# Patient Record
Sex: FEMALE | Race: WHITE | NOT HISPANIC OR LATINO | Employment: FULL TIME | URBAN - METROPOLITAN AREA
[De-identification: names, ages, dates, MRNs, and addresses within clinical notes are randomized per-mention and may not be internally consistent; named-entity substitution may affect disease eponyms.]

---

## 2018-03-08 DIAGNOSIS — E03.9 HYPOTHYROIDISM, UNSPECIFIED TYPE: Primary | ICD-10-CM

## 2018-03-08 RX ORDER — LEVOTHYROXINE SODIUM 0.15 MG/1
150 TABLET ORAL DAILY
COMMUNITY
End: 2018-03-08 | Stop reason: SDUPTHER

## 2018-03-08 RX ORDER — LEVOTHYROXINE SODIUM 0.15 MG/1
150 TABLET ORAL DAILY
Qty: 30 TABLET | Refills: 0 | Status: SHIPPED | OUTPATIENT
Start: 2018-03-08 | End: 2018-04-13 | Stop reason: CLARIF

## 2018-04-13 ENCOUNTER — OFFICE VISIT (OUTPATIENT)
Dept: ENDOCRINOLOGY | Facility: HOSPITAL | Age: 48
End: 2018-04-13
Payer: COMMERCIAL

## 2018-04-13 ENCOUNTER — TELEPHONE (OUTPATIENT)
Dept: ENDOCRINOLOGY | Facility: HOSPITAL | Age: 48
End: 2018-04-13

## 2018-04-13 VITALS
HEART RATE: 78 BPM | BODY MASS INDEX: 20.31 KG/M2 | DIASTOLIC BLOOD PRESSURE: 80 MMHG | SYSTOLIC BLOOD PRESSURE: 118 MMHG | WEIGHT: 129.4 LBS | HEIGHT: 67 IN

## 2018-04-13 DIAGNOSIS — E89.0 POSTOPERATIVE HYPOTHYROIDISM: Primary | ICD-10-CM

## 2018-04-13 DIAGNOSIS — D34 FOLLICULAR ADENOMA OF THYROID GLAND: ICD-10-CM

## 2018-04-13 PROCEDURE — 99203 OFFICE O/P NEW LOW 30 MIN: CPT | Performed by: INTERNAL MEDICINE

## 2018-04-13 RX ORDER — LEVOTHYROXINE SODIUM 0.15 MG/1
150 TABLET ORAL DAILY
COMMUNITY
End: 2018-04-13 | Stop reason: SDUPTHER

## 2018-04-13 RX ORDER — DESOGESTREL AND ETHINYL ESTRADIOL 0.15-0.03
1 KIT ORAL DAILY
COMMUNITY
End: 2021-10-01

## 2018-04-13 RX ORDER — LEVOTHYROXINE SODIUM 150 UG/1
150 TABLET ORAL DAILY
Qty: 30 TABLET | Refills: 12 | Status: SHIPPED | OUTPATIENT
Start: 2018-04-13 | End: 2018-05-24 | Stop reason: SDUPTHER

## 2018-04-13 NOTE — PROGRESS NOTES
4/13/2018    Assessment/Plan      Diagnoses and all orders for this visit:    Postoperative hypothyroidism  -     SYNTHROID 150 MCG tablet; Take 1 tablet (150 mcg total) by mouth daily  -     TSH, 3rd generation Lab Collect; Future  -     T4, free Lab Collect; Future    Follicular adenoma of thyroid gland  -     SYNTHROID 150 MCG tablet; Take 1 tablet (150 mcg total) by mouth daily  -     TSH, 3rd generation Lab Collect; Future  -     T4, free Lab Collect; Future    Other orders  -     desogestrel-ethinyl estradiol (DESOGEN) 0 15-30 MG-MCG per tablet; Take 1 tablet by mouth daily  -     Discontinue: levothyroxine (SYNTHROID) 150 mcg tablet; Take 150 mcg by mouth daily        Assessment/Plan:  1  Hypothyroidism post subtotal thyroidectomy  Unfortunately, her thyroid blood work is not here  For now, I will continue the same Synthroid brand 150 mcg daily  Once the blood work is here, we will call her with results and make changes as needed  2   Follicular adenoma of the thyroid  She is post thyroidectomy  She is on thyroid hormone and we keep her thyroid levels in the hyperthyroid side of normal with a TSH below 1  I will see her in a year with preceding TSH and free T4       CC: thyroid consult    History of Present Illness     HPI: Jacquie Reeder is a 50y o  year old female with history of hypothyroidism post subtotal thyroidectomy  While pregnant, in 1996, she was found to have a thyroid nodule that was biopsied and questionable  She was recommended to have surgery  She waited until after she delivered and had a subtotal thyroidectomy in 1996  The pathology did show a benign follicular adenoma of the thyroid  She has been on thyroid hormone ever since  She is on brand-name Synthroid 150 mcg 1 tablet daily  She denies heat or cold intolerance, but is having some sweats at night  Menstrual cycles are occurring on a regular monthly basis well on her oral contraceptives, although they are light in flow  She denies palpitation, tremors, anxiety, insomnia, or fatigue  She denies diarrhea or constipation  Weight has been stable  She has lost 3 lb since 2017  She denies diplopia  She has no difficulties with swallowing  Review of Systems   Constitutional: Positive for diaphoresis  Negative for fatigue and unexpected weight change  Some increase in diaphoresis at night  Weight about 3 lbs less than 2017  HENT: Negative for ear pain, hearing loss and tinnitus  Eyes: Negative for visual disturbance  No diplopia  Respiratory: Negative for chest tightness and shortness of breath  Cardiovascular: Negative for chest pain, palpitations and leg swelling  Gastrointestinal: Negative for abdominal pain, constipation, diarrhea and nausea  Some heartburn at times  Endocrine: Negative for cold intolerance and heat intolerance  Genitourinary:        Menses regular on a monthly basis but light on OCP  Musculoskeletal: Positive for back pain  Negative for arthralgias  Has some low back pain  Is primarily on the left  Skin: Negative for rash  No dry skin, brittle nails, or hair loss  Neurological: Negative for dizziness, tremors, light-headedness, numbness and headaches  Psychiatric/Behavioral: Negative for dysphoric mood and sleep disturbance  The patient is not nervous/anxious  Historical Information   No past medical history on file    Past Surgical History:   Procedure Laterality Date    THYROIDECTOMY      subtotal    WISDOM TOOTH EXTRACTION       Social History   History   Alcohol Use    8 4 oz/week    14 Glasses of wine per week     History   Drug Use No     History   Smoking Status    Never Smoker   Smokeless Tobacco    Never Used     Family History:   Family History   Problem Relation Age of Onset    No Known Problems Mother     No Known Problems Father     Diabetes type II Sister     No Known Problems Daughter     No Known Problems Son Meds/Allergies   Current Outpatient Prescriptions   Medication Sig Dispense Refill    desogestrel-ethinyl estradiol (DESOGEN) 0 15-30 MG-MCG per tablet Take 1 tablet by mouth daily      SYNTHROID 150 MCG tablet Take 1 tablet (150 mcg total) by mouth daily 30 tablet 12     No current facility-administered medications for this visit  No Known Allergies    Objective   Vitals: Blood pressure 118/80, pulse 78, height 5' 7" (1 702 m), weight 58 7 kg (129 lb 6 4 oz)  Invasive Devices          No matching active lines, drains, or airways          Physical Exam   Constitutional: She is oriented to person, place, and time  She appears well-developed and well-nourished  HENT:   Head: Normocephalic and atraumatic  Eyes: Conjunctivae and EOM are normal  Pupils are equal, round, and reactive to light  No lid lag, stare, proptosis, or periorbital edema  Neck: Normal range of motion  Neck supple  Healed anterior neck scar  No thyroid tissue palpable  Cardiovascular: Normal rate, regular rhythm, normal heart sounds and intact distal pulses  No murmur heard  Pulmonary/Chest: Effort normal and breath sounds normal  She has no wheezes  Abdominal: Soft  Bowel sounds are normal  There is no tenderness  Musculoskeletal: Normal range of motion  She exhibits no edema or deformity  No tremor of the outstretched hands  No CVAT  No spinous process tenderness  Lymphadenopathy:     She has no cervical adenopathy  Neurological: She is alert and oriented to person, place, and time  She has normal reflexes  Skin: Skin is warm and dry  No rash noted  Vitals reviewed  The history was obtained from the review of the chart and from the patient  Lab Results:    Lab work from late February 2018 is not here as yet  We are trying to get that blood work report  No results found for this or any previous visit (from the past [id number] hour(s))        Future Appointments  Date Time Provider Joseph Castano 4/16/2019 8:00 AM Blair Delgado MD [facility]

## 2018-04-13 NOTE — PATIENT INSTRUCTIONS
Awaiting blood work results  We'll call you with results  Continue the same Synthroid for now  Follow up in 1 year with blood work

## 2018-05-24 DIAGNOSIS — E89.0 POSTOPERATIVE HYPOTHYROIDISM: ICD-10-CM

## 2018-05-24 DIAGNOSIS — D34 FOLLICULAR ADENOMA OF THYROID GLAND: ICD-10-CM

## 2018-05-24 RX ORDER — LEVOTHYROXINE SODIUM 150 UG/1
TABLET ORAL
Qty: 90 TABLET | Refills: 0 | Status: SHIPPED | OUTPATIENT
Start: 2018-05-24 | End: 2018-08-24 | Stop reason: SDUPTHER

## 2018-08-24 DIAGNOSIS — D34 FOLLICULAR ADENOMA OF THYROID GLAND: ICD-10-CM

## 2018-08-24 DIAGNOSIS — E89.0 POSTOPERATIVE HYPOTHYROIDISM: ICD-10-CM

## 2018-08-24 RX ORDER — LEVOTHYROXINE SODIUM 150 UG/1
150 TABLET ORAL DAILY
Qty: 90 TABLET | Refills: 2 | Status: SHIPPED | OUTPATIENT
Start: 2018-08-24 | End: 2019-04-16 | Stop reason: SDUPTHER

## 2019-03-25 LAB
T4 FREE SERPL-MCNC: 1.97 NG/DL (ref 0.82–1.77)
TSH SERPL DL<=0.005 MIU/L-ACNC: 0.15 UIU/ML (ref 0.45–4.5)

## 2019-04-16 ENCOUNTER — OFFICE VISIT (OUTPATIENT)
Dept: ENDOCRINOLOGY | Facility: HOSPITAL | Age: 49
End: 2019-04-16
Payer: COMMERCIAL

## 2019-04-16 VITALS
BODY MASS INDEX: 20.76 KG/M2 | HEART RATE: 81 BPM | SYSTOLIC BLOOD PRESSURE: 110 MMHG | DIASTOLIC BLOOD PRESSURE: 74 MMHG | WEIGHT: 129.2 LBS | HEIGHT: 66 IN

## 2019-04-16 DIAGNOSIS — D34 FOLLICULAR ADENOMA OF THYROID GLAND: ICD-10-CM

## 2019-04-16 DIAGNOSIS — E89.0 POSTOPERATIVE HYPOTHYROIDISM: Primary | ICD-10-CM

## 2019-04-16 PROCEDURE — 99213 OFFICE O/P EST LOW 20 MIN: CPT | Performed by: INTERNAL MEDICINE

## 2019-04-16 RX ORDER — LEVOTHYROXINE SODIUM 150 UG/1
TABLET ORAL
Qty: 30 TABLET | Refills: 11 | Status: SHIPPED | OUTPATIENT
Start: 2019-04-16 | End: 2020-04-03

## 2020-01-29 ENCOUNTER — TELEPHONE (OUTPATIENT)
Dept: ENDOCRINOLOGY | Facility: HOSPITAL | Age: 50
End: 2020-01-29

## 2020-03-20 LAB
T4 FREE SERPL-MCNC: 1.8 NG/DL (ref 0.8–1.8)
TSH SERPL-ACNC: 2.61 MIU/L

## 2020-04-03 DIAGNOSIS — E89.0 POSTOPERATIVE HYPOTHYROIDISM: ICD-10-CM

## 2020-04-03 DIAGNOSIS — D34 FOLLICULAR ADENOMA OF THYROID GLAND: ICD-10-CM

## 2020-04-03 RX ORDER — LEVOTHYROXINE SODIUM 150 UG/1
TABLET ORAL
Qty: 30 TABLET | Refills: 11 | Status: SHIPPED | OUTPATIENT
Start: 2020-04-03 | End: 2020-04-21 | Stop reason: SDUPTHER

## 2020-04-21 ENCOUNTER — TELEMEDICINE (OUTPATIENT)
Dept: ENDOCRINOLOGY | Facility: HOSPITAL | Age: 50
End: 2020-04-21
Payer: COMMERCIAL

## 2020-04-21 DIAGNOSIS — E89.0 POSTOPERATIVE HYPOTHYROIDISM: Primary | ICD-10-CM

## 2020-04-21 DIAGNOSIS — D34 FOLLICULAR ADENOMA OF THYROID GLAND: ICD-10-CM

## 2020-04-21 PROCEDURE — 99215 OFFICE O/P EST HI 40 MIN: CPT | Performed by: INTERNAL MEDICINE

## 2020-04-21 RX ORDER — LEVOTHYROXINE SODIUM 150 UG/1
TABLET ORAL
Qty: 90 TABLET | Refills: 3 | Status: SHIPPED | OUTPATIENT
Start: 2020-04-21 | End: 2021-04-26

## 2021-04-24 DIAGNOSIS — E89.0 POSTOPERATIVE HYPOTHYROIDISM: ICD-10-CM

## 2021-04-24 DIAGNOSIS — D34 FOLLICULAR ADENOMA OF THYROID GLAND: ICD-10-CM

## 2021-04-26 RX ORDER — LEVOTHYROXINE SODIUM 0.15 MG/1
TABLET ORAL
Qty: 30 TABLET | Refills: 11 | Status: SHIPPED | OUTPATIENT
Start: 2021-04-26 | End: 2021-04-27 | Stop reason: DRUGHIGH

## 2021-04-27 ENCOUNTER — OFFICE VISIT (OUTPATIENT)
Dept: ENDOCRINOLOGY | Facility: HOSPITAL | Age: 51
End: 2021-04-27
Payer: COMMERCIAL

## 2021-04-27 VITALS
HEIGHT: 66 IN | SYSTOLIC BLOOD PRESSURE: 120 MMHG | WEIGHT: 133.6 LBS | DIASTOLIC BLOOD PRESSURE: 80 MMHG | BODY MASS INDEX: 21.47 KG/M2 | HEART RATE: 62 BPM

## 2021-04-27 DIAGNOSIS — D34 FOLLICULAR ADENOMA OF THYROID GLAND: ICD-10-CM

## 2021-04-27 DIAGNOSIS — E89.0 POSTOPERATIVE HYPOTHYROIDISM: Primary | ICD-10-CM

## 2021-04-27 PROCEDURE — 99213 OFFICE O/P EST LOW 20 MIN: CPT | Performed by: INTERNAL MEDICINE

## 2021-04-27 RX ORDER — LEVOTHYROXINE SODIUM 137 UG/1
137 TABLET ORAL DAILY
Qty: 30 TABLET | Refills: 11 | Status: SHIPPED | OUTPATIENT
Start: 2021-04-27 | End: 2021-09-09 | Stop reason: SDUPTHER

## 2021-04-27 RX ORDER — MELATONIN
1000 DAILY
COMMUNITY

## 2021-04-27 RX ORDER — MAGNESIUM CARB/ALUMINUM HYDROX 105-160MG
TABLET,CHEWABLE ORAL 2 TIMES DAILY
COMMUNITY

## 2021-04-27 RX ORDER — PHENOL 1.4 %
AEROSOL, SPRAY (ML) MUCOUS MEMBRANE DAILY
COMMUNITY

## 2021-04-27 NOTE — PATIENT INSTRUCTIONS
The thyroid is a bit overactive  Let's decrease the levothyroxine to 137 mcg daily  We'll recheck the blood work in 3-4 months  follow up in 1 year with blood work

## 2021-04-27 NOTE — PROGRESS NOTES
4/28/2021    Assessment/Plan      Diagnoses and all orders for this visit:    Postoperative hypothyroidism  -     TSH, 3rd generation Lab Collect; Future  -     T4, free Lab Collect; Future  -     TSH, 3rd generation Lab Collect; Future  -     T4, free Lab Collect; Future  -     levothyroxine 137 mcg tablet; Take 1 tablet (137 mcg total) by mouth daily    Follicular adenoma of thyroid gland  -     TSH, 3rd generation Lab Collect; Future  -     T4, free Lab Collect; Future  -     TSH, 3rd generation Lab Collect; Future  -     T4, free Lab Collect; Future  -     levothyroxine 137 mcg tablet; Take 1 tablet (137 mcg total) by mouth daily    Other orders  -     Calcium Carb-Cholecalciferol (OSCAL-D) 500 mg-200 units per tablet; Take 1 tablet by mouth daily  -     Glucosamine-Chondroitin 750-600 MG TABS; Take by mouth 2 (two) times a day  -     cholecalciferol (VITAMIN D3) 1,000 units tablet; Take 1,000 Units by mouth daily  -     Multiple Vitamins-Minerals (Multivitamin Adults 50+) TABS; Take by mouth daily        Assessment/Plan:   1  Hypothyroidism post thyroidectomy  Her recent thyroid function tests do show a suppressed TSH consistent with biochemical hyperthyroidism and over replacement with thyroid hormone  I have asked her to decrease her levothyroxine to 137 mcg daily  I will repeat thyroid function tests in 3-4 months and adjust her thyroid hormone accordingly  2  Follicular adenoma of the thyroid  Her thyroid nodule was benign  She is on thyroid hormone for replacement purposes only  She has no compressive thyroid symptoms  I have asked her to repeat a TSH and free T4 in 3-4 months  I have asked her to follow up in 1 year with preceding TSH and free T4       CC:   Hypothyroid follow-up    History of Present Illness     HPI: Quoc Riggs is a 44y o  year old female with history of Hypothyroidism post subtotal thyroidectomy for follow-up visit    She was found to have a thyroid nodule on while pregnant in 1996  When the biopsy was done, and there was a questionable pathology  After she delivered, she underwent subtotal thyroidectomy in the final pathology did demonstrate benign follicular adenoma of the thyroid  She has been on thyroid hormone for replacement purposes ever since  She is currently taking levothyroxine 150 mcg 1 tablet 6 days a week and half tablet on the 7th day  Weight is 4 lb more than 2019  She does have some fatigue  She has occasional warm moments but no actual heat or cold intolerance  She denies palpitation or tremors  She denies dry skin, brittle nails, or hair loss  She has some insomnia will wake up at night and be unable to get back to sleep  She denies anxiety or depression, diarrhea or constipation  She denies diplopia  She denies compressive thyroid symptoms or difficulties with swallowing  She was not getting menstrual periods while on oral contraceptives and stopped her oral contraceptives in August 2020  She has not had a menstrual period until last month when she did have a menses  Review of Systems   Constitutional: Positive for fatigue  Negative for unexpected weight change  Weight 4 lb more than 2019  HENT: Negative for trouble swallowing  Eyes: Negative for visual disturbance  Wears glasses  No diplopia  Respiratory: Negative for chest tightness and shortness of breath  Cardiovascular: Negative for chest pain and palpitations  Gastrointestinal: Negative for abdominal pain, constipation, diarrhea and nausea  Endocrine: Negative for cold intolerance and heat intolerance  Occasional warm moments  Genitourinary:        Stopped OCP in august 2020  No menses until last month and now only one recurred  Musculoskeletal: Positive for back pain  SI joint pain bilateral, low back/buttocks pain  particularly with bending and certain movements  Saw chiropractor  Skin: Negative for rash  No dry skin   No brittle nails  No hair loss  Neurological: Negative for dizziness, tremors, light-headedness and headaches  Psychiatric/Behavioral: Positive for sleep disturbance  Negative for dysphoric mood  The patient is not nervous/anxious  Insomnia, wakes at night and unable to get back to sleep for a while  Historical Information   Past Medical History:   Diagnosis Date    SI (sacroiliac) joint inflammation (HCC)      Past Surgical History:   Procedure Laterality Date    THYROIDECTOMY      subtotal    WISDOM TOOTH EXTRACTION       Social History   Social History     Substance and Sexual Activity   Alcohol Use Yes    Alcohol/week: 14 0 standard drinks    Types: 14 Glasses of wine per week     Social History     Substance and Sexual Activity   Drug Use No     Social History     Tobacco Use   Smoking Status Never Smoker   Smokeless Tobacco Never Used     Family History:   Family History   Problem Relation Age of Onset    No Known Problems Mother     No Known Problems Father     Diabetes type II Sister     No Known Problems Daughter     No Known Problems Son        Meds/Allergies   Current Outpatient Medications   Medication Sig Dispense Refill    Calcium Carb-Cholecalciferol (OSCAL-D) 500 mg-200 units per tablet Take 1 tablet by mouth daily      cholecalciferol (VITAMIN D3) 1,000 units tablet Take 1,000 Units by mouth daily      Glucosamine-Chondroitin 750-600 MG TABS Take by mouth 2 (two) times a day      Multiple Vitamins-Minerals (Multivitamin Adults 50+) TABS Take by mouth daily      desogestrel-ethinyl estradiol (DESOGEN) 0 15-30 MG-MCG per tablet Take 1 tablet by mouth daily      levothyroxine 137 mcg tablet Take 1 tablet (137 mcg total) by mouth daily 30 tablet 11     No current facility-administered medications for this visit  No Known Allergies    Objective   Vitals: Blood pressure 120/80, pulse 62, height 5' 6 22" (1 682 m), weight 60 6 kg (133 lb 9 6 oz)    Invasive Devices None                 Physical Exam  Vitals signs reviewed  Constitutional:       Appearance: Normal appearance  She is well-developed  HENT:      Head: Normocephalic and atraumatic  Eyes:      Extraocular Movements: Extraocular movements intact  Conjunctiva/sclera: Conjunctivae normal       Comments: No lid lag, stare, proptosis, or periorbital edema  Neck:      Musculoskeletal: Normal range of motion and neck supple  Thyroid: No thyromegaly  Vascular: No carotid bruit  Comments: Healed anterior neck scar  No palpable thyroid nodules  Cardiovascular:      Rate and Rhythm: Normal rate and regular rhythm  Heart sounds: Normal heart sounds  No murmur  Pulmonary:      Effort: Pulmonary effort is normal       Breath sounds: Normal breath sounds  No wheezing  Abdominal:      Palpations: Abdomen is soft  Musculoskeletal: Normal range of motion  General: No deformity  Right lower leg: No edema  Left lower leg: No edema  Comments: No tremor of the outstretched hands  Lymphadenopathy:      Cervical: No cervical adenopathy  Skin:     General: Skin is warm and dry  Findings: No rash  Neurological:      Mental Status: She is alert and oriented to person, place, and time  Deep Tendon Reflexes: Reflexes are normal and symmetric  Comments: Deep tendon reflexes normal          The history was obtained from the review of the chart and from the patient  Lab Results:        Blood work done on 04/05/2021 showed a TSH of 0 06 with a free T4 of 1 7        Future Appointments   Date Time Provider Joseph Castano   4/19/2022  8:20 AM Wilmer Mohan MD [facility]

## 2021-08-03 ENCOUNTER — VBI (OUTPATIENT)
Dept: ADMINISTRATIVE | Facility: OTHER | Age: 51
End: 2021-08-03

## 2021-08-03 NOTE — TELEPHONE ENCOUNTER
Upon review of the In Basket request we were able to locate, review, and update the patient chart as requested for Immunization(s) Influenza and Pap Smear (HPV) aka Cervical Cancer Screening  Any additional questions or concerns should be emailed to the Practice Liaisons via [email or web address] email, please do not reply via In Basket      Thank you  Elías Yoo

## 2021-09-07 LAB
T4 FREE SERPL-MCNC: 2 NG/DL (ref 0.8–1.8)
TSH SERPL-ACNC: 0.16 MIU/L

## 2021-09-09 DIAGNOSIS — D34 FOLLICULAR ADENOMA OF THYROID GLAND: ICD-10-CM

## 2021-09-09 DIAGNOSIS — E89.0 POSTOPERATIVE HYPOTHYROIDISM: Primary | ICD-10-CM

## 2021-09-09 DIAGNOSIS — E89.0 POSTOPERATIVE HYPOTHYROIDISM: ICD-10-CM

## 2021-09-09 RX ORDER — LEVOTHYROXINE SODIUM 137 UG/1
TABLET ORAL
Qty: 30 TABLET | Refills: 11
Start: 2021-09-09 | End: 2021-10-08 | Stop reason: SDUPTHER

## 2021-10-01 ENCOUNTER — ANNUAL EXAM (OUTPATIENT)
Dept: OBGYN CLINIC | Facility: CLINIC | Age: 51
End: 2021-10-01
Payer: COMMERCIAL

## 2021-10-01 VITALS
DIASTOLIC BLOOD PRESSURE: 70 MMHG | WEIGHT: 132 LBS | SYSTOLIC BLOOD PRESSURE: 100 MMHG | BODY MASS INDEX: 20.72 KG/M2 | HEIGHT: 67 IN

## 2021-10-01 DIAGNOSIS — Z01.419 ENCOUNTER FOR GYNECOLOGICAL EXAMINATION WITHOUT ABNORMAL FINDING: Primary | ICD-10-CM

## 2021-10-01 DIAGNOSIS — Z12.4 CERVICAL CANCER SCREENING: ICD-10-CM

## 2021-10-01 DIAGNOSIS — Z12.31 ENCOUNTER FOR SCREENING MAMMOGRAM FOR MALIGNANT NEOPLASM OF BREAST: ICD-10-CM

## 2021-10-01 PROCEDURE — 99396 PREV VISIT EST AGE 40-64: CPT | Performed by: OBSTETRICS & GYNECOLOGY

## 2021-10-04 LAB
CLINICAL INFO: NORMAL
CYTOLOGY CMNT CVX/VAG CYTO-IMP: NORMAL
DATE PREVIOUS BX: NORMAL
HPV E6+E7 MRNA CVX QL NAA+PROBE: NOT DETECTED
LMP START DATE: NORMAL
SL AMB PREV. PAP:: NORMAL
SPECIMEN SOURCE CVX/VAG CYTO: NORMAL
STAT OF ADQ CVX/VAG CYTO-IMP: NORMAL

## 2021-10-08 ENCOUNTER — NURSE TRIAGE (OUTPATIENT)
Dept: OTHER | Facility: OTHER | Age: 51
End: 2021-10-08

## 2021-10-08 DIAGNOSIS — D34 FOLLICULAR ADENOMA OF THYROID GLAND: ICD-10-CM

## 2021-10-08 DIAGNOSIS — E89.0 POSTOPERATIVE HYPOTHYROIDISM: ICD-10-CM

## 2021-10-08 RX ORDER — LEVOTHYROXINE SODIUM 137 UG/1
TABLET ORAL
Qty: 7 TABLET | Refills: 0 | Status: SHIPPED | OUTPATIENT
Start: 2021-10-08 | End: 2021-10-11 | Stop reason: SDUPTHER

## 2021-10-11 DIAGNOSIS — D34 FOLLICULAR ADENOMA OF THYROID GLAND: ICD-10-CM

## 2021-10-11 DIAGNOSIS — E89.0 POSTOPERATIVE HYPOTHYROIDISM: Primary | ICD-10-CM

## 2021-10-11 RX ORDER — LEVOTHYROXINE SODIUM 137 UG/1
TABLET ORAL
Qty: 78 TABLET | Refills: 2 | Status: SHIPPED | OUTPATIENT
Start: 2021-10-11 | End: 2022-04-19 | Stop reason: SDUPTHER

## 2021-12-07 LAB
T4 FREE SERPL-MCNC: 2 NG/DL (ref 0.8–1.8)
TSH SERPL-ACNC: 0.37 MIU/L

## 2022-04-19 ENCOUNTER — OFFICE VISIT (OUTPATIENT)
Dept: ENDOCRINOLOGY | Facility: HOSPITAL | Age: 52
End: 2022-04-19
Payer: COMMERCIAL

## 2022-04-19 VITALS
WEIGHT: 134.4 LBS | HEART RATE: 74 BPM | BODY MASS INDEX: 21.09 KG/M2 | SYSTOLIC BLOOD PRESSURE: 100 MMHG | DIASTOLIC BLOOD PRESSURE: 80 MMHG | HEIGHT: 67 IN

## 2022-04-19 DIAGNOSIS — D34 FOLLICULAR ADENOMA OF THYROID GLAND: ICD-10-CM

## 2022-04-19 DIAGNOSIS — E89.0 POSTOPERATIVE HYPOTHYROIDISM: Primary | ICD-10-CM

## 2022-04-19 PROCEDURE — 99213 OFFICE O/P EST LOW 20 MIN: CPT | Performed by: INTERNAL MEDICINE

## 2022-04-19 RX ORDER — LEVOTHYROXINE SODIUM 137 UG/1
TABLET ORAL
Qty: 90 TABLET | Refills: 3 | Status: SHIPPED | OUTPATIENT
Start: 2022-04-19 | End: 2022-06-27

## 2022-04-19 NOTE — PROGRESS NOTES
4/19/2022    Assessment/Plan      Diagnoses and all orders for this visit:    Postoperative hypothyroidism  -     TSH, 3rd generation Lab Collect; Future  -     T4, free Lab Collect; Future  -     levothyroxine 137 mcg tablet; 1 tablet 6 days a week and 1/2 tablet on sunday  -     CBC and differential Lab Collect; Future  -     Comprehensive metabolic panel Lab Collect; Future  -     Lipid Panel with Direct LDL reflex Lab Collect; Future    Follicular adenoma of thyroid gland  -     TSH, 3rd generation Lab Collect; Future  -     T4, free Lab Collect; Future  -     levothyroxine 137 mcg tablet; 1 tablet 6 days a week and 1/2 tablet on sunday  -     CBC and differential Lab Collect; Future  -     Comprehensive metabolic panel Lab Collect; Future  -     Lipid Panel with Direct LDL reflex Lab Collect; Future        Assessment/Plan:  1  Hypothyroidism post subtotal thyroidectomy  Most recent thyroid function tests are normal   She is biochemically and clinically euthyroid  She will continue the same levothyroxine 137 mcg 1 tablet 6 days a week and half tablet on Sunday  2  Follicular adenoma of the thyroid gland  This was a benign process so no suppressive thyroid hormone therapy needs to be performed  I have asked her to follow up in 1 year with preceding TSH, free T4, CMP, CBC, and lipid panel  CC:  Hypothyroid follow-up    History of Present Illness     HPI: Luc Collado is a 46y o  year old female with history of hypothyroidism post subtotal thyroidectomy benign follicular adenoma for follow-up visit  She was found to have a thyroid nodule when pregnant in 1996  The biopsy was indeterminate on pathology  After she delivered, she underwent subtotal thyroidectomy and the final pathology did demonstrated benign follicular adenoma  She is on thyroid hormone for replacement purposes  She is currently taking levothyroxine 137 mcg tablet 6 days a week and half tablet on Sunday    She does have some fatigue but also has some insomnia in that she will wake up at night and be unable to get back to sleep  She denies heat or cold intolerance, diarrhea or constipation, anxiety or depression, palpitation, tremors, weight changes, dry skin, brittle nails, and hair loss  She has no compressive thyroid symptoms or difficulties with swallowing  She denies diplopia  Review of Systems   Constitutional: Positive for fatigue  Negative for unexpected weight change  HENT: Negative for trouble swallowing  Eyes: Negative for visual disturbance  Wears glasses  No diplopia  Respiratory: Negative for chest tightness and shortness of breath  Cardiovascular: Negative for chest pain and palpitations  Gastrointestinal: Negative for abdominal pain, constipation, diarrhea and nausea  Endocrine: Negative for cold intolerance and heat intolerance  Genitourinary:        Postmenopausal    Skin: Negative for rash  No dry skin  No brittle nails  No hair loss  Neurological: Negative for dizziness, tremors, light-headedness and headaches  Psychiatric/Behavioral: Positive for sleep disturbance  Negative for dysphoric mood  The patient is not nervous/anxious  Wakes up in the night and then can't get back to sleep          Historical Information   Past Medical History:   Diagnosis Date    Hyperthyroidism     Papanicolaou smear for cervical cancer screening 2018    SI (sacroiliac) joint inflammation ([facility] [address] )      Past Surgical History:   Procedure Laterality Date    MAMMO (HISTORICAL)  2012    THYROIDECTOMY      subtotal    WISDOM TOOTH EXTRACTION       Social History   Social History     Substance and Sexual Activity   Alcohol Use Yes    Alcohol/week: 14 0 standard drinks    Types: 14 Glasses of wine per week     Social History     Substance and Sexual Activity   Drug Use No     Social History     Tobacco Use   Smoking Status Never Smoker   Smokeless Tobacco Never Used     Family History: Family History   Problem Relation Age of Onset    No Known Problems Mother     Thyroid disease Father     Diabetes type II Sister     No Known Problems Daughter     No Known Problems Son     Hypertension Maternal Grandmother        Meds/Allergies   Current Outpatient Medications   Medication Sig Dispense Refill    Calcium Carb-Cholecalciferol (OSCAL-D) 500 mg-200 units per tablet Take 1 tablet by mouth daily      cholecalciferol (VITAMIN D3) 1,000 units tablet Take 1,000 Units by mouth daily      Glucosamine-Chondroitin 750-600 MG TABS Take by mouth 2 (two) times a day      levothyroxine 137 mcg tablet 1 tablet 6 days a week and 1/2 tablet on sunday 90 tablet 3    Multiple Vitamins-Minerals (Multivitamin Adults 50+) TABS Take by mouth daily       No current facility-administered medications for this visit  No Known Allergies    Objective   Vitals: Blood pressure 100/80, pulse 74, height 5' 7" (1 702 m), weight 61 kg (134 lb 6 4 oz)  Invasive Devices  Report    None                 Physical Exam  Vitals reviewed  Constitutional:       Appearance: Normal appearance  She is well-developed and normal weight  HENT:      Head: Normocephalic and atraumatic  Eyes:      Extraocular Movements: Extraocular movements intact  Conjunctiva/sclera: Conjunctivae normal       Comments: No lid lag, stare, proptosis, or periorbital edema  Neck:      Thyroid: No thyromegaly  Vascular: No carotid bruit  Comments: Healed anterior neck scar  No palpable thyroid tissue  Cardiovascular:      Rate and Rhythm: Normal rate and regular rhythm  Heart sounds: Normal heart sounds  No murmur heard  Pulmonary:      Effort: Pulmonary effort is normal       Breath sounds: Normal breath sounds  No wheezing  Abdominal:      Palpations: Abdomen is soft  Musculoskeletal:         General: No deformity  Normal range of motion  Cervical back: Normal range of motion and neck supple        Right lower leg: No edema  Left lower leg: No edema  Comments: No tremor of the outstretched hands  Lymphadenopathy:      Cervical: No cervical adenopathy  Skin:     General: Skin is warm and dry  Findings: No rash  Neurological:      Mental Status: She is alert and oriented to person, place, and time  Deep Tendon Reflexes: Reflexes are normal and symmetric  Comments: Deep tendon reflexes normal          The history was obtained from the review of the chart and from the patient  Lab Results:   Blood work done on 04/11/2022 showed a TSH of 1 07          Lab Results   Component Value Date    TSH 0 37 (L) 12/07/2021    FREET4 2 0 (H) 12/07/2021       Future Appointments   Date Time Provider Joseph Castano   4/21/2023  8:00 AM Yaneli Shirley MD [facility]

## 2022-04-19 NOTE — PATIENT INSTRUCTIONS
The thyroid blood work is normal    Continue the same levothyroxine dosage  Follow up in 1 year with blood work

## 2022-06-25 DIAGNOSIS — E89.0 POSTOPERATIVE HYPOTHYROIDISM: ICD-10-CM

## 2022-06-25 DIAGNOSIS — D34 FOLLICULAR ADENOMA OF THYROID GLAND: ICD-10-CM

## 2022-06-27 RX ORDER — LEVOTHYROXINE SODIUM 137 UG/1
TABLET ORAL
Qty: 78 TABLET | Refills: 5 | Status: SHIPPED | OUTPATIENT
Start: 2022-06-27

## 2022-08-16 DIAGNOSIS — E89.0 POSTOPERATIVE HYPOTHYROIDISM: ICD-10-CM

## 2022-08-16 DIAGNOSIS — D34 FOLLICULAR ADENOMA OF THYROID GLAND: ICD-10-CM

## 2022-08-16 RX ORDER — LEVOTHYROXINE SODIUM 137 UG/1
TABLET ORAL
Qty: 84 TABLET | Refills: 6 | Status: SHIPPED | OUTPATIENT
Start: 2022-08-16 | End: 2022-10-10

## 2022-10-09 DIAGNOSIS — D34 FOLLICULAR ADENOMA OF THYROID GLAND: ICD-10-CM

## 2022-10-09 DIAGNOSIS — E89.0 POSTOPERATIVE HYPOTHYROIDISM: ICD-10-CM

## 2022-10-10 RX ORDER — LEVOTHYROXINE SODIUM 137 UG/1
TABLET ORAL
Qty: 78 TABLET | Refills: 8 | Status: SHIPPED | OUTPATIENT
Start: 2022-10-10

## 2022-11-05 DIAGNOSIS — D34 FOLLICULAR ADENOMA OF THYROID GLAND: ICD-10-CM

## 2022-11-05 DIAGNOSIS — E89.0 POSTOPERATIVE HYPOTHYROIDISM: ICD-10-CM

## 2022-11-07 RX ORDER — LEVOTHYROXINE SODIUM 137 UG/1
TABLET ORAL
Qty: 78 TABLET | Refills: 9 | Status: SHIPPED | OUTPATIENT
Start: 2022-11-07

## 2022-12-02 DIAGNOSIS — D34 FOLLICULAR ADENOMA OF THYROID GLAND: ICD-10-CM

## 2022-12-02 DIAGNOSIS — E89.0 POSTOPERATIVE HYPOTHYROIDISM: ICD-10-CM

## 2022-12-02 RX ORDER — LEVOTHYROXINE SODIUM 137 UG/1
TABLET ORAL
Qty: 78 TABLET | Refills: 10 | Status: SHIPPED | OUTPATIENT
Start: 2022-12-02

## 2022-12-26 DIAGNOSIS — E89.0 POSTOPERATIVE HYPOTHYROIDISM: ICD-10-CM

## 2022-12-26 DIAGNOSIS — D34 FOLLICULAR ADENOMA OF THYROID GLAND: ICD-10-CM

## 2022-12-27 RX ORDER — LEVOTHYROXINE SODIUM 137 UG/1
TABLET ORAL
Qty: 78 TABLET | Refills: 11 | Status: SHIPPED | OUTPATIENT
Start: 2022-12-27

## 2023-01-21 DIAGNOSIS — D34 FOLLICULAR ADENOMA OF THYROID GLAND: ICD-10-CM

## 2023-01-21 DIAGNOSIS — E89.0 POSTOPERATIVE HYPOTHYROIDISM: ICD-10-CM

## 2023-01-23 RX ORDER — LEVOTHYROXINE SODIUM 137 UG/1
TABLET ORAL
Qty: 78 TABLET | Refills: 0 | Status: SHIPPED | OUTPATIENT
Start: 2023-01-23

## 2023-02-16 DIAGNOSIS — E89.0 POSTOPERATIVE HYPOTHYROIDISM: ICD-10-CM

## 2023-02-16 DIAGNOSIS — D34 FOLLICULAR ADENOMA OF THYROID GLAND: ICD-10-CM

## 2023-02-16 RX ORDER — LEVOTHYROXINE SODIUM 137 UG/1
TABLET ORAL
Qty: 78 TABLET | Refills: 1 | Status: SHIPPED | OUTPATIENT
Start: 2023-02-16

## 2023-03-18 LAB
ALBUMIN SERPL-MCNC: 4.1 G/DL (ref 3.8–4.9)
ALBUMIN/GLOB SERPL: 1.7 {RATIO} (ref 1.2–2.2)
ALP SERPL-CCNC: 73 IU/L (ref 44–121)
ALT SERPL-CCNC: 30 IU/L (ref 0–32)
AST SERPL-CCNC: 31 IU/L (ref 0–40)
BASOPHILS # BLD AUTO: 0.1 X10E3/UL (ref 0–0.2)
BASOPHILS NFR BLD AUTO: 1 %
BILIRUB SERPL-MCNC: 0.4 MG/DL (ref 0–1.2)
BUN SERPL-MCNC: 13 MG/DL (ref 6–24)
BUN/CREAT SERPL: 14 (ref 9–23)
CALCIUM SERPL-MCNC: 9.7 MG/DL (ref 8.7–10.2)
CHLORIDE SERPL-SCNC: 100 MMOL/L (ref 96–106)
CHOLEST SERPL-MCNC: 212 MG/DL (ref 100–199)
CHOLEST/HDLC SERPL: 3.1 RATIO (ref 0–4.4)
CO2 SERPL-SCNC: 29 MMOL/L (ref 20–29)
CREAT SERPL-MCNC: 0.94 MG/DL (ref 0.57–1)
EGFR: 72 ML/MIN/1.73
EOSINOPHIL # BLD AUTO: 0.2 X10E3/UL (ref 0–0.4)
EOSINOPHIL NFR BLD AUTO: 4 %
ERYTHROCYTE [DISTWIDTH] IN BLOOD BY AUTOMATED COUNT: 12.7 % (ref 11.7–15.4)
GLOBULIN SER-MCNC: 2.4 G/DL (ref 1.5–4.5)
GLUCOSE SERPL-MCNC: 93 MG/DL (ref 70–99)
HCT VFR BLD AUTO: 43.5 % (ref 34–46.6)
HDLC SERPL-MCNC: 68 MG/DL
HGB BLD-MCNC: 14.6 G/DL (ref 11.1–15.9)
IMM GRANULOCYTES # BLD: 0 X10E3/UL (ref 0–0.1)
IMM GRANULOCYTES NFR BLD: 0 %
LDLC SERPL CALC-MCNC: 127 MG/DL (ref 0–99)
LYMPHOCYTES # BLD AUTO: 2.2 X10E3/UL (ref 0.7–3.1)
LYMPHOCYTES NFR BLD AUTO: 35 %
MCH RBC QN AUTO: 29.4 PG (ref 26.6–33)
MCHC RBC AUTO-ENTMCNC: 33.6 G/DL (ref 31.5–35.7)
MCV RBC AUTO: 88 FL (ref 79–97)
MONOCYTES # BLD AUTO: 0.5 X10E3/UL (ref 0.1–0.9)
MONOCYTES NFR BLD AUTO: 7 %
NEUTROPHILS # BLD AUTO: 3.3 X10E3/UL (ref 1.4–7)
NEUTROPHILS NFR BLD AUTO: 53 %
PLATELET # BLD AUTO: 193 X10E3/UL (ref 150–450)
POTASSIUM SERPL-SCNC: 4.3 MMOL/L (ref 3.5–5.2)
PROT SERPL-MCNC: 6.5 G/DL (ref 6–8.5)
RBC # BLD AUTO: 4.96 X10E6/UL (ref 3.77–5.28)
SL AMB VLDL CHOLESTEROL CALC: 17 MG/DL (ref 5–40)
SODIUM SERPL-SCNC: 139 MMOL/L (ref 134–144)
T4 FREE SERPL-MCNC: 2.2 NG/DL (ref 0.82–1.77)
TRIGL SERPL-MCNC: 98 MG/DL (ref 0–149)
TSH SERPL DL<=0.005 MIU/L-ACNC: 0.04 UIU/ML (ref 0.45–4.5)
WBC # BLD AUTO: 6.3 X10E3/UL (ref 3.4–10.8)

## 2023-03-21 DIAGNOSIS — D34 FOLLICULAR ADENOMA OF THYROID GLAND: ICD-10-CM

## 2023-03-21 DIAGNOSIS — E89.0 POSTOPERATIVE HYPOTHYROIDISM: ICD-10-CM

## 2023-03-21 RX ORDER — LEVOTHYROXINE SODIUM 137 UG/1
TABLET ORAL
Qty: 78 TABLET | Refills: 2 | Status: SHIPPED | OUTPATIENT
Start: 2023-03-21

## 2023-04-24 NOTE — PROGRESS NOTES
[address], [address], [facility], [address] [facility]    ASSESSMENT/PLAN: Oliver Barber is a 45 y o   who presents for annual gynecologic exam     Encounter for routine gynecologic examination  - Routine well woman exam completed today  - Cervical Cancer Screening: Current ASCCP Guidelines reviewed  Last Pap: 10/01/2021   Next Pap Due:   - COVID vaccine Pfiser  2021  - Breast Cancer Screening: Last Mammogram Not on file,   - Colorectal cancer screening dw  Pt encouraged   The  cologuard  wnl   - The following were reviewed in today's visit: breast self exam, mammography screening ordered, use and side effects of HRT, menopause, osteoporosis, adequate intake of calcium and vitamin D, exercise, healthy diet, DEXA ordered and colonoscopy discussed and declined at this time    Additional problems addressed during this visit:  1  Encounter for gynecological examination without abnormal finding    2  Encounter for screening mammogram for malignant neoplasm of breast  -     Mammo screening bilateral w 3d & cad; Future    3  Contraceptive education    4  BMI 20 0-20 9, adult    Pt  w  lmp in   2021  No bleeding since  Stopped ocp  In 2020  No hot flashes  Sleep is poor  +  No dryness not sexually active  Thyroid is off  Dw pt  Lower normal  BMI   +  Highly encouraged colonoscopy and mammogram     Referred to  Jessica Farley for counseling  Highly encouraged! No feelings of hurting self or others  CC:  Annual Gynecologic Examination    HPI: Oliver Barber is a 45 y o   who presents for annual gynecologic examination  44 yo   here for wellness exam   Neg  cotesting in     Not  Currently sexually active  Denies  Bleeding, blaoting and satiety     Doing okay       The following portions of the patient's history were reviewed and updated as appropriate: She  has a past medical history of Hyperthyroidism, Papanicolaou smear for cervical cancer screening (), "and SI (sacroiliac) joint inflammation ([facility] [address] )  She  has a past surgical history that includes Thyroidectomy; [place] tooth extraction; Mammo (historical) (2023); and Mammo (historical) (2023)  Her family history includes Diabetes type II in her sister; Hypertension in her maternal grandmother; No Known Problems in her daughter, mother, and son; Thyroid disease in her father  She  reports that she has never smoked  She has never used smokeless tobacco  She reports current alcohol use of about 14 0 standard drinks per week  She reports that she does not use drugs  Current Outpatient Medications   Medication Sig Dispense Refill   • Ascorbic Acid (vitamin C) 100 MG tablet Take 100 mg by mouth daily     • Calcium Carb-Cholecalciferol (OSCAL-D) 500 mg-200 units per tablet Take 1 tablet by mouth daily     • cholecalciferol (VITAMIN D3) 1,000 units tablet Take 1,000 Units by mouth daily     • Glucosamine-Chondroitin 750-600 MG TABS Take by mouth 2 (two) times a day     • levothyroxine 137 mcg tablet TAKE 1 TABLET 6 DAYS A WEEK AND 1/2 TABLET ON SUNDAY (Patient taking differently: TAKE 1 TABLET 6 DAYS A WEEK AND NONE ON SUNDAY) 78 tablet 3   • Multiple Vitamins-Minerals (Multivitamin Adults 50+) TABS Take by mouth daily     • zinc gluconate 50 mg tablet Take 50 mg by mouth daily     • Melatonin-Magnesium Citrate (SLOWMAG MG CALM/SLEEP PO) Take by mouth Powder, 1 1/2 teaspoons nightly (Patient not taking: Reported on 4/25/2023)       No current facility-administered medications for this visit  She has No Known Allergies       Review of Systems:  All systems normal except as noted in HPI          Objective:  /64 (BP Location: Left arm, Patient Position: Sitting, Cuff Size: Standard)   Ht 5' 7\" (1 702 m)   Wt 60 4 kg (133 lb 3 2 oz)   BMI 20 86 kg/m²    Physical Exam  Vitals and nursing note reviewed  Constitutional:       Appearance: Normal appearance  HENT:      Head: Normocephalic     Cardiovascular:      " Rate and Rhythm: Normal rate and regular rhythm  Pulses: Normal pulses  Heart sounds: Normal heart sounds  Pulmonary:      Effort: Pulmonary effort is normal       Breath sounds: Normal breath sounds  Chest:      Chest wall: No mass, lacerations, swelling, tenderness or edema  Breasts: Onel Score is 4  Breasts are symmetrical       Right: Normal  No swelling, bleeding, inverted nipple, mass, nipple discharge, skin change or tenderness  Left: No swelling, bleeding, inverted nipple, mass, nipple discharge, skin change or tenderness  Abdominal:      General: Abdomen is flat  Bowel sounds are normal       Palpations: Abdomen is soft  Genitourinary:     General: Normal vulva  Exam position: Lithotomy position  Pubic Area: No rash  Onel stage (genital): 4       Labia:         Right: No rash, tenderness or lesion  Left: No rash, tenderness or lesion  Urethra: No urethral pain, urethral swelling or urethral lesion  Vagina: Normal       Cervix: No cervical motion tenderness or discharge  Uterus: Normal        Adnexa: Right adnexa normal and left adnexa normal       Rectum: Normal    Musculoskeletal:         General: Normal range of motion  Cervical back: Neck supple  Lymphadenopathy:      Upper Body:      Right upper body: No supraclavicular, axillary or pectoral adenopathy  Left upper body: No supraclavicular, axillary or pectoral adenopathy  Lower Body: No right inguinal adenopathy  No left inguinal adenopathy  Skin:     General: Skin is warm and dry  Neurological:      General: No focal deficit present  Mental Status: She is alert and oriented to person, place, and time  Psychiatric:         Mood and Affect: Mood normal          Speech: Speech normal          Behavior: Behavior normal  Behavior is cooperative  Thought Content: Thought content normal  Thought content is not paranoid   Thought content does not include homicidal or suicidal ideation           Cognition and Memory: Cognition normal          Judgment: Judgment normal

## 2023-04-25 ENCOUNTER — ANNUAL EXAM (OUTPATIENT)
Dept: OBGYN CLINIC | Facility: CLINIC | Age: 53
End: 2023-04-25

## 2023-04-25 VITALS
HEIGHT: 67 IN | BODY MASS INDEX: 20.91 KG/M2 | SYSTOLIC BLOOD PRESSURE: 100 MMHG | WEIGHT: 133.2 LBS | DIASTOLIC BLOOD PRESSURE: 64 MMHG

## 2023-04-25 DIAGNOSIS — Z78.0 POSTMENOPAUSAL: ICD-10-CM

## 2023-04-25 DIAGNOSIS — Z12.31 ENCOUNTER FOR SCREENING MAMMOGRAM FOR MALIGNANT NEOPLASM OF BREAST: ICD-10-CM

## 2023-04-25 DIAGNOSIS — Z30.09 CONTRACEPTIVE EDUCATION: ICD-10-CM

## 2023-04-25 DIAGNOSIS — Z01.419 ENCOUNTER FOR GYNECOLOGICAL EXAMINATION WITHOUT ABNORMAL FINDING: Primary | ICD-10-CM

## 2023-05-01 DIAGNOSIS — D34 FOLLICULAR ADENOMA OF THYROID GLAND: ICD-10-CM

## 2023-05-01 DIAGNOSIS — E89.0 POSTOPERATIVE HYPOTHYROIDISM: ICD-10-CM

## 2023-05-02 RX ORDER — LEVOTHYROXINE SODIUM 137 UG/1
TABLET ORAL
Qty: 78 TABLET | Refills: 4 | Status: SHIPPED | OUTPATIENT
Start: 2023-05-02

## 2024-04-20 DIAGNOSIS — D34 FOLLICULAR ADENOMA OF THYROID GLAND: ICD-10-CM

## 2024-04-20 DIAGNOSIS — E89.0 POSTOPERATIVE HYPOTHYROIDISM: ICD-10-CM

## 2024-04-22 RX ORDER — LEVOTHYROXINE SODIUM 137 UG/1
TABLET ORAL
Qty: 26 TABLET | Refills: 1 | Status: SHIPPED | OUTPATIENT
Start: 2024-04-22

## 2024-04-30 LAB
T4 FREE SERPL-MCNC: 1.81 NG/DL (ref 0.82–1.77)
TSH SERPL DL<=0.005 MIU/L-ACNC: 0.29 UIU/ML (ref 0.45–4.5)

## 2024-05-14 ENCOUNTER — OFFICE VISIT (OUTPATIENT)
Dept: ENDOCRINOLOGY | Facility: HOSPITAL | Age: 54
End: 2024-05-14
Payer: COMMERCIAL

## 2024-05-14 VITALS
HEART RATE: 79 BPM | OXYGEN SATURATION: 98 % | WEIGHT: 137.8 LBS | BODY MASS INDEX: 21.63 KG/M2 | DIASTOLIC BLOOD PRESSURE: 78 MMHG | SYSTOLIC BLOOD PRESSURE: 122 MMHG | HEIGHT: 67 IN

## 2024-05-14 DIAGNOSIS — E89.0 POSTOPERATIVE HYPOTHYROIDISM: Primary | ICD-10-CM

## 2024-05-14 DIAGNOSIS — D34 FOLLICULAR ADENOMA OF THYROID GLAND: ICD-10-CM

## 2024-05-14 PROCEDURE — 99214 OFFICE O/P EST MOD 30 MIN: CPT | Performed by: INTERNAL MEDICINE

## 2024-05-14 RX ORDER — LEVOTHYROXINE SODIUM 0.12 MG/1
TABLET ORAL
Qty: 90 TABLET | Refills: 3 | Status: SHIPPED | OUTPATIENT
Start: 2024-05-14

## 2024-05-14 NOTE — PROGRESS NOTES
5/14/2024    Assessment/Plan      Diagnoses and all orders for this visit:    Postoperative hypothyroidism  -     T4, free; Future  -     TSH, 3rd generation; Future  -     T4, free  -     TSH, 3rd generation  -     T4, free; Future  -     TSH, 3rd generation; Future  -     T4, free  -     TSH, 3rd generation  -     levothyroxine 125 mcg tablet; Take 1 tablet 6 days a week    Follicular adenoma of thyroid gland  -     T4, free; Future  -     TSH, 3rd generation; Future  -     T4, free  -     TSH, 3rd generation  -     T4, free; Future  -     TSH, 3rd generation; Future  -     T4, free  -     TSH, 3rd generation        Assessment & Plan  1.  Hypothyroidism post thyroidectomy.  The most recent thyroid function studies indicate a low TSH and an elevated free T4 level, indicative of biochemical hyperthyroidism. Given the current state, I have decided to reduce her levothyroxine dosage to 125 mcg, to be taken one tablet six days a week. I have advised her to repeat her blood work in approximately 3 to 4 months.    2. Follicular adenoma of the thyroid.  This is a non-malignant lesion. A replacement thyroid dosage is recommended. Given that she is currently hyperthyroid, I will reduce her levothyroxine dosage.     I have advised her to undergo a TSH and free T4 test in 3 to 4 months.     A follow-up appointment has been scheduled for 1 year from now, which will include a preceding TSH and free T4.      CC: Hypothyroid follow-up    History of Present Illness    HPI: Korina Taylor is a 53-year-old female with history of hypothyroidism, post subtotal thyroidectomy for a benign follicular adenoma, here for follow-up visit.    The patient is currently on a regimen of 137 mcg of levothyroxine, administered as one tablet six days a week, with a single day off, typically on Fridays or Sundays. She reports feeling well on this regimen, with no noticeable changes. She denies experiencing temperature fluctuations, palpitations,  tremors, or hand tremors. Gastrointestinal symptoms such as diarrhea or constipation are also denied. She does, however, report mild dry skin. There is no reported nail breakage or hair loss. Her sleep pattern is generally satisfactory, with occasional nocturnal awakenings for 1 to 2 hours. However, she acknowledges a previous hip fracture that prevented her from tracking her sleep patterns. Over the past 1 to 2 weeks, her sleep has been satisfactory. Daytime fatigue is minimal. She is postmenopausal and denies any dysphagia, diplopia, anxiety, or depression.    The patient suspects she may be experiencing carpal tunnel syndrome, as evidenced by intermittent nocturnal numbness in her fingers in both hands. Despite the use of a wrist brace, she continues to experience numbness in her fingers. She is scheduled for an EMG at the end of the month. Her occupation as an  involves frequent repetitive hand movements.      Historical Information   Past Medical History:   Diagnosis Date    Hyperthyroidism     Papanicolaou smear for cervical cancer screening 2021    SI (sacroiliac) joint inflammation (HCC)      Past Surgical History:   Procedure Laterality Date    MAMMO (HISTORICAL)  2023    pt states at  [facility] and dense breasts    MAMMO (HISTORICAL)  2023    THYROIDECTOMY      subtotal    [place]IS[place] TOOTH EXTRACTION       Social History   Social History     Substance and Sexual Activity   Alcohol Use Yes    Alcohol/week: 14.0 standard drinks of alcohol    Types: 14 Glasses of wine per week     Social History     Substance and Sexual Activity   Drug Use No     Social History     Tobacco Use   Smoking Status Never   Smokeless Tobacco Never     Family History:   Family History   Problem Relation Age of Onset    No Known Problems Mother     Thyroid disease Father     Diabetes type II Sister     No Known Problems Daughter     No Known Problems Son     Hypertension Maternal Grandmother        Meds/Allergies  "  Current Outpatient Medications   Medication Sig Dispense Refill    Ascorbic Acid (vitamin C) 100 MG tablet Take 100 mg by mouth daily      Calcium Carb-Cholecalciferol (OSCAL-D) 500 mg-200 units per tablet Take 1 tablet by mouth daily      cholecalciferol (VITAMIN D3) 1,000 units tablet Take 1,000 Units by mouth daily      levothyroxine 125 mcg tablet Take 1 tablet 6 days a week 90 tablet 3    Multiple Vitamins-Minerals (Multivitamin Adults 50+) TABS Take by mouth daily      zinc gluconate 50 mg tablet Take 50 mg by mouth daily      Glucosamine-Chondroitin 750-600 MG TABS Take by mouth 2 (two) times a day (Patient not taking: Reported on 5/14/2024)      Melatonin-Magnesium Citrate (SLOWMAG MG CALM/SLEEP PO) Take by mouth Powder, 1 1/2 teaspoons nightly (Patient not taking: Reported on 4/25/2023)       No current facility-administered medications for this visit.     No Known Allergies    Objective   Vitals: Blood pressure 122/78, pulse 79, height 5' 7\" (1.702 m), weight 62.5 kg (137 lb 12.8 oz), SpO2 98%.  Invasive Devices       None                   Physical Exam  Physical exam normal with pertinent positives and negatives.    No lid lag, stare, proptosis, or periorbital edema in the eyes.  There is a healed anterior scar on the neck. No palpable thyroid tissue in the neck. No lymphadenopathy in the neck.  Lungs are clear to auscultation.  Heart has a regular rate and rhythm. No murmurs detected.  No tremor noted in the outstretched hands.  Patellar deep tendon reflexes are normal with right more brisk than the left.      The history was obtained from the review of the chart and from the patient.    Lab Results:          Lab Results   Component Value Date    CREATININE 0.94 03/17/2023    BUN 13 03/17/2023    K 4.3 03/17/2023     03/17/2023    CO2 29 03/17/2023     eGFR   Date Value Ref Range Status   03/17/2023 72 >59 mL/min/1.73 Final         Lab Results   Component Value Date    HDL 68 03/17/2023    TRIG " 98 03/17/2023    CHOLHDL 3.1 03/17/2023       Lab Results   Component Value Date    ALT 30 03/17/2023    AST 31 03/17/2023       Lab Results   Component Value Date    TSH 0.286 (L) 04/29/2024    FREET4 1.81 (H) 04/29/2024             Future Appointments   Date Time Provider Department Center   5/28/2024  8:00 AM [facility]   5/14/2025  8:00 AM Rocio Mcdonald MD [facility]

## 2024-05-14 NOTE — PATIENT INSTRUCTIONS
The thyroid is a bit overactive at present.     Decrease the levothyroxine 125 mcg 6 days a week.     We'll recheck blood work in 3-4 months.     Follow up in 1 year with blood work.

## 2024-05-27 NOTE — PROGRESS NOTES
[facility] - [address], [address], [place] [address]    ASSESSMENT/PLAN: Kornia Taylor is a 53 y.o.  who presents for annual gynecologic exam.    Encounter for routine gynecologic examination  - Routine well woman exam completed today.  - Cervical Cancer Screening: Current ASCCP Guidelines reviewed. Last Pap: 10/01/2021 . Next Pap Due: today  - STI screening offered including HIV testing: Declined  - Contraceptive counseling discussed.  Current contraception: none:   - Breast Cancer Screening: Last Mammogram Not on file, pt states that she is due now   -colonoscopy  pt  did  cologuard last year     Additional problems addressed during this visit:  1. Encounter for gynecological examination without abnormal finding  2. Encounter for screening mammogram for malignant neoplasm of breast  -     Mammo screening bilateral w 3d & cad; Future  3. Screening for cervical cancer  -     IGP, Aptima HPV, Rfx 16/18,45  4. Postmenopausal      CC:  Annual Gynecologic Examination    HPI: Korina Taylor is a 53 y.o.  who presents for annual gynecologic examination.  54 yo  here for wellness exam.  LMP 2021  No bleeding, bloating or  satiety.        The following portions of the patient's history were reviewed and updated as appropriate: She  has a past medical history of Hyperthyroidism, Papanicolaou smear for cervical cancer screening (), and SI (sacroiliac) joint inflammation (HCC).  She  has a past surgical history that includes Thyroidectomy; [place] tooth extraction; Mammo (historical) (); and Mammo (historical) ().  Her family history includes Diabetes type II in her sister; Hypertension in her maternal grandmother; No Known Problems in her daughter, mother, and son; Thyroid disease in her father.  She  reports that she has never smoked. She has never used smokeless tobacco. She reports current alcohol use of about 14.0 standard drinks of alcohol per week. She reports that she does  "not use drugs.  Current Outpatient Medications   Medication Sig Dispense Refill   • Ascorbic Acid (vitamin C) 100 MG tablet Take 100 mg by mouth daily     • cholecalciferol (VITAMIN D3) 1,000 units tablet Take 1,000 Units by mouth daily     • levothyroxine 125 mcg tablet Take 1 tablet 6 days a week 90 tablet 3   • Multiple Vitamins-Minerals (Multivitamin Adults 50+) TABS Take by mouth daily     • zinc gluconate 50 mg tablet Take 50 mg by mouth daily       No current facility-administered medications for this visit.     She has No Known Allergies..    Review of Systems   Constitutional:  Negative for chills and fever.   HENT:  Negative for ear pain and sore throat.    Eyes:  Negative for pain and visual disturbance.   Respiratory:  Negative for cough and shortness of breath.    Cardiovascular:  Negative for chest pain and palpitations.   Gastrointestinal:  Negative for abdominal pain and vomiting.   Endocrine: Negative.    Genitourinary: Negative.  Negative for dysuria and hematuria.   Musculoskeletal: Negative.  Negative for arthralgias and back pain.   Skin:  Negative for color change and rash.   Allergic/Immunologic: Negative.    Neurological: Negative.  Negative for seizures and syncope.   Hematological: Negative.    Psychiatric/Behavioral: Negative.     All other systems reviewed and are negative.        Objective:  /74 (BP Location: Left arm, Patient Position: Sitting, Cuff Size: Standard)   Ht 5' 5.5\" (1.664 m)   Wt 61.8 kg (136 lb 3.2 oz)   LMP  (LMP Unknown)   Breastfeeding No   BMI 22.32 kg/m²    Physical Exam  Vitals and nursing note reviewed.   Constitutional:       Appearance: Normal appearance.   HENT:      Head: Normocephalic.   Cardiovascular:      Rate and Rhythm: Normal rate and regular rhythm.      Pulses: Normal pulses.      Heart sounds: Normal heart sounds.   Pulmonary:      Effort: Pulmonary effort is normal.      Breath sounds: Normal breath sounds.   Chest:      Chest wall: No " mass, lacerations, swelling, tenderness or edema.   Breasts:     Onel Score is 4.      Breasts are symmetrical.      Right: Normal. No swelling, bleeding, inverted nipple, mass, nipple discharge, skin change or tenderness.      Left: No swelling, bleeding, inverted nipple, mass, nipple discharge, skin change or tenderness.   Abdominal:      General: Abdomen is flat. Bowel sounds are normal.      Palpations: Abdomen is soft.   Genitourinary:     General: Normal vulva.      Exam position: Lithotomy position.      Pubic Area: No rash.       Onel stage (genital): 4.      Labia:         Right: No rash, tenderness or lesion.         Left: No rash, tenderness or lesion.       Urethra: No urethral pain, urethral swelling or urethral lesion.      Cervix: Friability present. No cervical motion tenderness or discharge.      Uterus: Normal.       Adnexa: Right adnexa normal and left adnexa normal.      Rectum: Normal.      Comments: Atrophy noted    Musculoskeletal:         General: Normal range of motion.      Cervical back: Neck supple.   Lymphadenopathy:      Upper Body:      Right upper body: No supraclavicular, axillary or pectoral adenopathy.      Left upper body: No supraclavicular, axillary or pectoral adenopathy.      Lower Body: No right inguinal adenopathy. No left inguinal adenopathy.   Skin:     General: Skin is warm and dry.   Neurological:      General: No focal deficit present.      Mental Status: She is alert and oriented to person, place, and time.   Psychiatric:         Mood and Affect: Mood normal.         Behavior: Behavior normal.         Thought Content: Thought content normal.         Judgment: Judgment normal.

## 2024-05-28 ENCOUNTER — ANNUAL EXAM (OUTPATIENT)
Dept: OBGYN CLINIC | Facility: CLINIC | Age: 54
End: 2024-05-28
Payer: COMMERCIAL

## 2024-05-28 VITALS
WEIGHT: 136.2 LBS | BODY MASS INDEX: 21.89 KG/M2 | SYSTOLIC BLOOD PRESSURE: 112 MMHG | HEIGHT: 66 IN | DIASTOLIC BLOOD PRESSURE: 74 MMHG

## 2024-05-28 DIAGNOSIS — Z01.419 ENCOUNTER FOR GYNECOLOGICAL EXAMINATION WITHOUT ABNORMAL FINDING: Primary | ICD-10-CM

## 2024-05-28 DIAGNOSIS — Z12.31 ENCOUNTER FOR SCREENING MAMMOGRAM FOR MALIGNANT NEOPLASM OF BREAST: ICD-10-CM

## 2024-05-28 DIAGNOSIS — Z12.4 SCREENING FOR CERVICAL CANCER: ICD-10-CM

## 2024-05-28 DIAGNOSIS — Z78.0 POSTMENOPAUSAL: ICD-10-CM

## 2024-05-28 PROCEDURE — S0612 ANNUAL GYNECOLOGICAL EXAMINA: HCPCS | Performed by: OBSTETRICS & GYNECOLOGY

## 2024-05-30 LAB
CYTOLOGIST CVX/VAG CYTO: NORMAL
DX ICD CODE: NORMAL
HPV GENOTYPE REFLEX: NORMAL
HPV I/H RISK 4 DNA CVX QL PROBE+SIG AMP: NEGATIVE
OTHER STN SPEC: NORMAL
PATH REPORT.FINAL DX SPEC: NORMAL
SL AMB NOTE:: NORMAL
SL AMB SPECIMEN ADEQUACY: NORMAL
SL AMB TEST METHODOLOGY: NORMAL

## 2024-06-06 DIAGNOSIS — E89.0 POSTOPERATIVE HYPOTHYROIDISM: ICD-10-CM

## 2024-06-06 RX ORDER — LEVOTHYROXINE SODIUM 0.12 MG/1
TABLET ORAL
Qty: 72 TABLET | Refills: 1 | Status: SHIPPED | OUTPATIENT
Start: 2024-06-06

## 2024-06-27 DIAGNOSIS — E89.0 POSTOPERATIVE HYPOTHYROIDISM: ICD-10-CM

## 2024-06-27 PROBLEM — Z01.419 ENCOUNTER FOR GYNECOLOGICAL EXAMINATION WITHOUT ABNORMAL FINDING: Status: RESOLVED | Noted: 2024-05-28 | Resolved: 2024-06-27

## 2024-06-27 PROBLEM — Z12.4 SCREENING FOR CERVICAL CANCER: Status: RESOLVED | Noted: 2024-05-28 | Resolved: 2024-06-27

## 2024-06-27 RX ORDER — LEVOTHYROXINE SODIUM 0.12 MG/1
TABLET ORAL
Qty: 72 TABLET | Refills: 1 | Status: SHIPPED | OUTPATIENT
Start: 2024-06-27

## 2024-07-17 DIAGNOSIS — E89.0 POSTOPERATIVE HYPOTHYROIDISM: ICD-10-CM

## 2024-07-17 RX ORDER — LEVOTHYROXINE SODIUM 0.12 MG/1
TABLET ORAL
Qty: 72 TABLET | Refills: 1 | Status: SHIPPED | OUTPATIENT
Start: 2024-07-17

## 2024-08-14 DIAGNOSIS — E89.0 POSTOPERATIVE HYPOTHYROIDISM: ICD-10-CM

## 2024-08-14 RX ORDER — LEVOTHYROXINE SODIUM 125 UG/1
TABLET ORAL
Qty: 72 TABLET | Refills: 1 | Status: SHIPPED | OUTPATIENT
Start: 2024-08-14

## 2024-09-21 LAB
T4 FREE SERPL-MCNC: 1.93 NG/DL (ref 0.82–1.77)
TSH SERPL DL<=0.005 MIU/L-ACNC: 1.04 UIU/ML (ref 0.45–4.5)

## 2024-09-27 ENCOUNTER — TELEPHONE (OUTPATIENT)
Dept: ENDOCRINOLOGY | Facility: HOSPITAL | Age: 54
End: 2024-09-27

## 2024-09-27 NOTE — TELEPHONE ENCOUNTER
----- Message from Roico Mcdonald MD sent at 9/27/2024  2:12 PM EDT -----  Call patient.  Her thyroid blood work has improved.  Continue the same levothyroxine dosage.

## 2024-11-03 DIAGNOSIS — E89.0 POSTOPERATIVE HYPOTHYROIDISM: ICD-10-CM

## 2024-11-04 RX ORDER — LEVOTHYROXINE SODIUM 125 UG/1
TABLET ORAL
Qty: 72 TABLET | Refills: 1 | Status: SHIPPED | OUTPATIENT
Start: 2024-11-04

## 2024-11-26 DIAGNOSIS — E89.0 POSTOPERATIVE HYPOTHYROIDISM: ICD-10-CM

## 2024-11-27 RX ORDER — LEVOTHYROXINE SODIUM 125 UG/1
TABLET ORAL
Qty: 72 TABLET | Refills: 1 | Status: SHIPPED | OUTPATIENT
Start: 2024-11-27

## 2024-12-22 DIAGNOSIS — E89.0 POSTOPERATIVE HYPOTHYROIDISM: ICD-10-CM

## 2024-12-23 RX ORDER — LEVOTHYROXINE SODIUM 125 UG/1
TABLET ORAL
Qty: 72 TABLET | Refills: 1 | Status: SHIPPED | OUTPATIENT
Start: 2024-12-23

## 2025-01-21 DIAGNOSIS — E89.0 POSTOPERATIVE HYPOTHYROIDISM: ICD-10-CM

## 2025-01-21 RX ORDER — LEVOTHYROXINE SODIUM 125 UG/1
TABLET ORAL
Qty: 72 TABLET | Refills: 1 | Status: SHIPPED | OUTPATIENT
Start: 2025-01-21

## 2025-02-15 DIAGNOSIS — E89.0 POSTOPERATIVE HYPOTHYROIDISM: ICD-10-CM

## 2025-02-17 RX ORDER — LEVOTHYROXINE SODIUM 125 UG/1
TABLET ORAL
Qty: 90 TABLET | Refills: 1 | Status: SHIPPED | OUTPATIENT
Start: 2025-02-17

## 2025-04-22 DIAGNOSIS — E89.0 POSTOPERATIVE HYPOTHYROIDISM: ICD-10-CM

## 2025-04-22 LAB
T4 FREE SERPL-MCNC: 1.65 NG/DL (ref 0.82–1.77)
TSH SERPL DL<=0.005 MIU/L-ACNC: 3.22 UIU/ML (ref 0.45–4.5)

## 2025-04-22 RX ORDER — LEVOTHYROXINE SODIUM 125 UG/1
TABLET ORAL
Qty: 72 TABLET | Refills: 1 | Status: SHIPPED | OUTPATIENT
Start: 2025-04-22

## 2025-05-20 ENCOUNTER — OFFICE VISIT (OUTPATIENT)
Dept: ENDOCRINOLOGY | Facility: HOSPITAL | Age: 55
End: 2025-05-20
Payer: COMMERCIAL

## 2025-05-20 VITALS
HEART RATE: 76 BPM | WEIGHT: 133 LBS | DIASTOLIC BLOOD PRESSURE: 74 MMHG | SYSTOLIC BLOOD PRESSURE: 114 MMHG | BODY MASS INDEX: 21.38 KG/M2 | HEIGHT: 66 IN

## 2025-05-20 DIAGNOSIS — D34 FOLLICULAR ADENOMA OF THYROID GLAND: ICD-10-CM

## 2025-05-20 DIAGNOSIS — E89.0 POSTOPERATIVE HYPOTHYROIDISM: Primary | ICD-10-CM

## 2025-05-20 PROCEDURE — 99214 OFFICE O/P EST MOD 30 MIN: CPT | Performed by: INTERNAL MEDICINE

## 2025-05-20 RX ORDER — LEVOTHYROXINE SODIUM 125 UG/1
TABLET ORAL
Qty: 72 TABLET | Refills: 1 | Status: SHIPPED | OUTPATIENT
Start: 2025-05-20

## 2025-05-20 RX ORDER — VITAMIN B COMPLEX
1 CAPSULE ORAL DAILY
COMMUNITY

## 2025-05-20 NOTE — PROGRESS NOTES
Name: Korina Taylor      : 1970      MRN: [id number]  Encounter Provider: Rocio Mcdonald MD  Encounter Date: 2025   Encounter department: [facility] [address]    No chief complaint on file.  :  Assessment & Plan  Postoperative hypothyroidism    Orders:    levothyroxine 125 mcg tablet; TAKE 1 TABLET BY MOUTH 6 DAYS A WEEK    T4, free; Future    TSH, 3rd generation; Future    Follicular adenoma of thyroid gland    Orders:    T4, free; Future    TSH, 3rd generation; Future      Assessment & Plan  1. Hypothyroidism post thyroidectomy:  - Reports feeling stable on levothyroxine 125 mcg, 1 pill 6 days a week, with no symptoms of hot flashes, cold intolerance, heart racing, palpitations, tremors, shaky hands, dry skin, nail brittleness, or hair loss.  - Physical exam shows no lid lag, stare, proptosis, or periorbital edema; healed anterior neck scar; no palpable thyroid tissue; no lymphadenopathy; lungs clear to auscultation; heart regular rate and rhythm; no murmurs; no tremor of the outstretched hand; patellar deep tendon reflexes normal.  - Discussed sensation of something being present in the throat, likely due to scar tissue from previous thyroidectomy; barium swallow test in 2025 showed no abnormalities. Weight down by 4 pounds since last year, possibly due to dietary changes, including having smoothies for dinner. Blood work results are within normal limits.  - Advised to continue current medication regimen and monitor symptoms.    I have asked her to follow-up in 1 year with preceding TSH and free T4.    I have spent a total time of 30 minutes in caring for this patient on the day of the visit/encounter including Diagnostic results, Prognosis, Risks and benefits of tx options, Instructions for management, Patient and family education, Importance of tx compliance, Risk factor reductions, Impressions, Counseling / Coordination of care, Documenting in the medical  record, Reviewing/placing orders in the medical record (including tests, medications, and/or procedures), and Obtaining or reviewing history  .      History of Present Illness   History of Present Illness  Korina Taylor is a 54-year-old female with a history of hypothyroidism, post subtotal thyroidectomy for a benign follicular adenoma, here for a follow-up visit.    She reports a stable condition on her current regimen of levothyroxine 125 mcg, administered as one tablet six days per week. She does not experience excessive heat or cold sensations, heart palpitations, tremors, shaky hands, dry skin, brittle nails, or hair loss. Her sleep quality has improved, and she does not feel excessively fatigued during the day. She also reports no gastrointestinal issues such as diarrhea or constipation. She reports no difficulty in swallowing but mentions a persistent sensation of an obstruction, which she speculates could be due to scar tissue. A barium swallow test conducted in 02/2025 did not reveal any abnormalities. She expresses interest in undergoing an endoscopy concurrently with a future colonoscopy. Her weight has remained relatively stable, with a slight decrease of 4 pounds since last year, which she attributes to a dietary change involving the consumption of smoothies for dinner. She believes this dietary modification has improved her digestion and sleep quality. She has not yet undergone a colonoscopy due to logistical issues related to transportation.    PAST SURGICAL HISTORY:  Subtotal thyroidectomy for benign follicular adenoma.      Pertinent Medical History   Korina Taylor is a 54-year-old female with a history of hypothyroidism, post subtotal thyroidectomy for a benign follicular adenoma, here for a follow-up visit.    She reports a stable condition on her current regimen of levothyroxine 125 mcg, administered as one tablet six days per week.        Review of Systems as per HPI  Medical History Reviewed by  "provider this encounter:  Tobacco  Allergies  Meds  Problems  Med Hx  Surg Hx  Fam Hx     .  Medications Ordered Prior to Encounter[1]   Social History[2]     Medical History Reviewed by provider this encounter:  Tobacco  Allergies  Meds  Problems  Med Hx  Surg Hx  Fam Hx     .    Objective   /74   Pulse 76   Ht 5' 5.5\" (1.664 m)   Wt 60.3 kg (133 lb)   LMP  (LMP Unknown)   BMI 21.80 kg/m²      Body mass index is 21.8 kg/m².  Wt Readings from Last 3 Encounters:   05/20/25 60.3 kg (133 lb)   05/28/24 61.8 kg (136 lb 3.2 oz)   05/14/24 62.5 kg (137 lb 12.8 oz)     Physical Exam  Physical Exam  Vital Signs: Weight is 133 lbs.  Head and Neck: No lid lag, stare, proptosis or periorbital edema. Healed anterior neck scar, no palpable thyroid tissue. No lymphadenopathy.  Cardiovascular: Heart has a regular rate and rhythm. No murmurs.  Respiratory: Lungs are clear to auscultation.  Musculoskeletal: No tremor of the outstretched hand.  Neurological: Patellar deep tendon reflex is normal.    Results    Labs:     Blood work performed on 4/21/2025 showed a TSH of 3.22 with a free T4 of 1.65.    Lab Results   Component Value Date    CREATININE 0.94 03/17/2023    BUN 13 03/17/2023    K 4.3 03/17/2023     03/17/2023    CO2 29 03/17/2023     eGFR   Date Value Ref Range Status   03/17/2023 72 >59 mL/min/1.73 Final     Lab Results   Component Value Date    HDL 68 03/17/2023    TRIG 98 03/17/2023    CHOLHDL 3.1 03/17/2023     Lab Results   Component Value Date    ALT 30 03/17/2023    AST 31 03/17/2023       Lab Results   Component Value Date    FREET4 1.65 04/21/2025       Patient Instructions   The thyroid blood work is normal.     Continue the same levothyroxine 125 mcg daily.     Follow up in 1 year with blood work.     Discussed with the patient and all questioned fully answered. She will call me if any problems arise.           [1]   Current Outpatient Medications on File Prior to Visit   Medication " Sig Dispense Refill    Ascorbic Acid (vitamin C) 100 MG tablet Take 100 mg by mouth in the morning.      b complex vitamins capsule Take 1 capsule by mouth daily      Calcium Carbonate (CALCIUM 600 PO) Take by mouth daily      Multiple Vitamins-Minerals (Multivitamin Adults 50+) TABS Take by mouth in the morning.      zinc gluconate 50 mg tablet Take 50 mg by mouth in the morning.      cholecalciferol (VITAMIN D3) 1,000 units tablet Take 1,000 Units by mouth daily (Patient not taking: Reported on 5/20/2025)       No current facility-administered medications on file prior to visit.   [2]   Social History  Tobacco Use    Smoking status: Never    Smokeless tobacco: Never   Vaping Use    Vaping status: Never Used   Substance and Sexual Activity    Alcohol use: Yes     Alcohol/week: 14.0 standard drinks of alcohol     Comment: Drinking occasionally.    Drug use: No    Sexual activity: Not Currently     Partners: Male     Comment:  no new partner in last year

## 2025-05-20 NOTE — PATIENT INSTRUCTIONS
The thyroid blood work is normal.     Continue the same levothyroxine 125 mcg daily.     Follow up in 1 year with blood work.

## 2025-05-20 NOTE — ASSESSMENT & PLAN NOTE
Orders:    levothyroxine 125 mcg tablet; TAKE 1 TABLET BY MOUTH 6 DAYS A WEEK    T4, free; Future    TSH, 3rd generation; Future

## 2025-06-06 ENCOUNTER — TELEPHONE (OUTPATIENT)
Age: 55
End: 2025-06-06

## 2025-06-06 NOTE — TELEPHONE ENCOUNTER
Patient called to schedule for a MRI result that may suggest MS     Informed the patient that we need the MRI imaging and reports from [facility] before we can schedule \      Patient having another MRI in a few weeks and will have imaging disc brought to the [facility] to be scanned into our system and give us a call back to inform once scanned to have our Providers view and advise for scheduling                     Thank you!

## 2025-07-08 ENCOUNTER — CONSULT (OUTPATIENT)
Dept: NEUROLOGY | Facility: CLINIC | Age: 55
End: 2025-07-08
Payer: COMMERCIAL

## 2025-07-08 VITALS
WEIGHT: 133 LBS | DIASTOLIC BLOOD PRESSURE: 82 MMHG | OXYGEN SATURATION: 99 % | HEART RATE: 83 BPM | BODY MASS INDEX: 21.8 KG/M2 | SYSTOLIC BLOOD PRESSURE: 128 MMHG

## 2025-07-08 DIAGNOSIS — G37.9 DEMYELINATING CHANGES IN BRAIN (HCC): Primary | ICD-10-CM

## 2025-07-08 PROCEDURE — 99204 OFFICE O/P NEW MOD 45 MIN: CPT | Performed by: STUDENT IN AN ORGANIZED HEALTH CARE EDUCATION/TRAINING PROGRAM

## 2025-07-08 NOTE — ASSESSMENT & PLAN NOTE
Patient presenting today due to abnormal brain MRI which included contrast. She initially went for the concern of hearing loss but there was a hyperintense T2 signal in the medial aspect of the right temporal lobe in which low-grade glioma could not be excluded. Fortunately, she has already established care with Neurosurgery at [place] and is under current surveillance with repeat MRI imaging (next one due in October). She also had numerous areas of hyperintense FLAIR signals in areas such as periventricular region and supratentorial region. These can be nonspecific changes, especially since she is not having any symptoms at this time besides the hearing loss. No history of MS or any neurological conditions in herself or family. Exam unremarkable besides nonsustained clonus in left ankle.      MRI brain with and without contrast (most recent one in June 2025): Stable bubbly hyperintense T2 signal lesion in medial aspect of right temporal lobe without enhancement. In addition, the stable nonspecific hyperintense FLAIR signals were again seen. No enhancing lesions were seen.     From a vascular standpoint, she had a lipid panel in 4/2025 which showed elevated total cholesterol 226, elevated , and elevated triglycerides 131. She is a lifelong nonsmoker as well      Plan:  Will obtain MRI cervical spine with and without contrast to evaluate for any additional lesions  Will also obtain DOMINICK, B12, RPR to rule out additional etiologies  Hem A1c ordered for additional vascular risk factors  Will reach out to one of our neuroimmunology specialists to see if any additional workup warranted at this time. Holding off on any CSF studies for now  Follow-up in 4 months, call office for any new concerns  Continue to follow-up closely with Neurosurgery       Orders:    MRI cervical spine with and without contrast; Future    DOMINICK Screen w/Reflex Cascade; Future    Vitamin B12; Future    RPR-Syphilis Screening (Total Syphilis  IGG/IGM); Future    Hemoglobin A1C; Future

## 2025-07-08 NOTE — LETTER
2025     ARELIS Sánchez  [address] [facility] [address]    Patient: Korina Taylor   YOB: 1970   Date of Visit: 2025       Dear ARELIS Herndon:    Thank you for referring Korina Taylor to me for evaluation. Below are my notes for this consultation.    If you have questions, please do not hesitate to call me. I look forward to following your patient along with you.         Sincerely,        Patria Soria MD        CC: No Recipients    Patria Soria MD  2025  5:02 PM  Sign when Signing Visit  Name: Korina Taylor      : 1970      MRN: [id number]  Encounter Provider: Patria Soria MD  Encounter Date: 2025   Encounter department: [facility]  :  Assessment & Plan  Demyelinating changes in brain (HCC)  Patient presenting today due to abnormal brain MRI which included contrast. She initially went for the concern of hearing loss but there was a hyperintense T2 signal in the medial aspect of the right temporal lobe in which low-grade glioma could not be excluded. Fortunately, she has already established care with Neurosurgery at [place] and is under current surveillance with repeat MRI imaging (next one due in October). She also had numerous areas of hyperintense FLAIR signals in areas such as periventricular region and supratentorial region. These can be nonspecific changes, especially since she is not having any symptoms at this time besides the hearing loss. No history of MS or any neurological conditions in herself or family. Exam unremarkable besides nonsustained clonus in left ankle.      MRI brain with and without contrast (most recent one in 2025): Stable bubbly hyperintense T2 signal lesion in medial aspect of right temporal lobe without enhancement. In addition, the stable nonspecific hyperintense FLAIR signals were again seen. No enhancing lesions were seen.     From a vascular standpoint, she had a lipid panel in 2025  which showed elevated total cholesterol 226, elevated , and elevated triglycerides 131. She is a lifelong nonsmoker as well      Plan:  Will obtain MRI cervical spine with and without contrast to evaluate for any additional lesions  Will also obtain DOMINICK, B12, RPR to rule out additional etiologies  Hem A1c ordered for additional vascular risk factors  Will reach out to one of our neuroimmunology specialists to see if any additional workup warranted at this time. Holding off on any CSF studies for now  Follow-up in 4 months, call office for any new concerns  Continue to follow-up closely with Neurosurgery       Orders:  •  MRI cervical spine with and without contrast; Future  •  DOMINICK Screen w/Reflex Cascade; Future  •  Vitamin B12; Future  •  RPR-Syphilis Screening (Total Syphilis IGG/IGM); Future  •  Hemoglobin A1C; Future          History of Present Illness  HPI   Korina Taylor is a 53 yo female PMH reflux and postoperative hypothyroidism presenting for concern over abnormal MRI results. 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 In addition, there were also numerous areas of hyperintense FLAIR signal in periventricular deep and subcortical supratentorial white matter which are nonspecific and can be seen with demyelinating processes. She had a follow-up MRI brain with and without contrast performed this past June which showed stable bubbly hyperintense T2 signal lesion in medial aspect of right temporal lobe without enhancement. In addition, the stable nonspecific hyperintense FLAIR signals were again seen. Her initial MRI was performed for hearing loss, for which she follows with ENT. She reports that she has already established care with  Neurosurgery from [place]. They told her that they are concerned about the region in her right temporal lobe but given the second MRI showed no significant change they plan on watching and monitoring with surveillance MRI imaging (next one in October). She denies any recent infections or history of autoimmune disease besides her hypothyroidism. No neurological disorders in her family. Her father had RA, no other autoimmune disorders. No other current symptoms besides issues with hearing loss R > L. She also follows with Opthalmology and was told she may have some degeneration of her retina. From a vascular standpoint, she had a lipid panel in 4/2025 which showed elevated total cholesterol 226, elevated , and elevated triglycerides 131. She is a nonsmoker and drinks alcohol socially.     Sensory symptoms: numbness in both hands at night, has history of carpal tunnel syndrome diagnosed via EMG/NCS last May recommended to have surgery. Wears a brace at night  Weakness: no  Spasms: no  Vision problems: eyes blurry when reading too long. Episode 4 years ago when vision became blurry for a few minutes then resolved. No pain  Ambulatory dysfunction: no  Vertigo and dizziness: occasional lightheadedness standing up too quickly  Fatigue: no  Bladder symptoms: no  Bowel symptoms: no  Lhermitte sign: no  Uhthoff's phenomenon: no  MS hug-like symptom: no  Family history of neurological disorders: no  Has some insomnia (difficulty getting back to sleep)    Review of Systems   Genitourinary:  Negative for difficulty urinating.   Neurological:  Positive for light-headedness. Negative for dizziness, tremors, seizures, syncope, facial asymmetry, speech difficulty, weakness, numbness and headaches.   Psychiatric/Behavioral:  Negative for confusion.     I have personally reviewed the MA's review of systems and made changes as necessary.     Objective  LMP  (LMP Unknown)     Physical Exam    Eyes:      General: Lids are  normal.      Extraocular Movements: Extraocular movements intact.      Pupils: Pupils are equal, round, and reactive to light.       Neurological:      Cranial Nerves: No dysarthria.      Deep Tendon Reflexes:      Reflex Scores:       Bicep reflexes are 2+ on the right side and 2+ on the left side.       Brachioradialis reflexes are 2+ on the right side and 2+ on the left side.       Patellar reflexes are 2+ on the right side and 2+ on the left side.       Achilles reflexes are 1+ on the right side and 1+ on the left side.      Neurological Exam  Mental Status  Awake, alert and oriented to person, place and time. no dysarthria present. Language is fluent with no aphasia.    Cranial Nerves  CN II: Visual acuity is normal. Visual fields full to confrontation.  CN III, IV, VI: Extraocular movements intact bilaterally. Normal lids and orbits bilaterally. Pupils equal round and reactive to light bilaterally.  CN V: Facial sensation is normal.  CN VII: Full and symmetric facial movement.  CN VIII: Hearing is normal.  CN IX, X: Palate elevates symmetrically. Normal gag reflex.  CN XI: Shoulder shrug strength is normal.  CN XII: Tongue midline without atrophy or fasciculations.    Motor   No abnormal involuntary movements.                                               Right                     Left   Shoulder abduction               5                          5  Elbow flexion                         5                          5  Elbow extension                    5                          5  Hip flexion                              5                          5  Knee flexion                           5                          5  Knee extension                      5                          5  Plantarflexion                         5                          5  Dorsiflexion                            5                          5    Sensory  Light touch is normal in upper and lower extremities.     Reflexes                                             Right                      Left  Brachioradialis                    2+                         2+  Biceps                                 2+                         2+  Patellar                                2+                         2+  Achilles                                1+                         1+  Right Plantar: downgoing  Left Plantar: downgoing    Right pathological reflexes: Manuel's absent. Crossed adductor absent. Ankle clonus absent.  Left pathological reflexes: Manuel's absent. Crossed adductor absent. Ankle clonus present.  Nonsustained clonus present on left (round 1 beat) .    Coordination  Right: Finger-to-nose normal. Heel-to-shin normal.Left: Finger-to-nose normal. Heel-to-shin normal.    Gait  Casual gait is normal including stance, stride, and arm swing.

## 2025-07-08 NOTE — PROGRESS NOTES
Name: Korina Taylor      : 1970      MRN: [id number]  Encounter Provider: Patria Soria MD  Encounter Date: 2025   Encounter department: [facility]  :  Assessment & Plan  Demyelinating changes in brain (HCC)  Patient presenting today due to abnormal brain MRI which included contrast. She initially went for the concern of hearing loss but there was a hyperintense T2 signal in the medial aspect of the right temporal lobe in which low-grade glioma could not be excluded. Fortunately, she has already established care with Neurosurgery at [facility] and is under current surveillance with repeat MRI imaging (next one due in October). She also had numerous areas of hyperintense FLAIR signals in areas such as periventricular region and supratentorial region. These can be nonspecific changes, especially since she is not having any symptoms at this time besides the hearing loss. No history of MS or any neurological conditions in herself or family. Exam unremarkable besides nonsustained clonus in left ankle.      MRI brain with and without contrast (most recent one in 2025): Stable bubbly hyperintense T2 signal lesion in medial aspect of right temporal lobe without enhancement. In addition, the stable nonspecific hyperintense FLAIR signals were again seen. No enhancing lesions were seen.     From a vascular standpoint, she had a lipid panel in 2025 which showed elevated total cholesterol 226, elevated , and elevated triglycerides 131. She is a lifelong nonsmoker as well      Plan:  Will obtain MRI cervical spine with and without contrast to evaluate for any additional lesions  Will also obtain DOMINICK, B12, RPR to rule out additional etiologies  Hem A1c ordered for additional vascular risk factors  Will reach out to one of our neuroimmunology specialists to see if any additional workup warranted at this time. Holding off on any CSF studies for now  Follow-up in 4 months, call  office for any new concerns  Continue to follow-up closely with Neurosurgery       Orders:    MRI cervical spine with and without contrast; Future    DOMINICK Screen w/Reflex Cascade; Future    Vitamin B12; Future    RPR-Syphilis Screening (Total Syphilis IGG/IGM); Future    Hemoglobin A1C; Future          History of Present Illness   HPI   Korina Taylor is a 55 yo female PMH reflux and postoperative hypothyroidism presenting for concern over abnormal MRI results. 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 In addition, there were also numerous areas of hyperintense FLAIR signal in periventricular deep and subcortical supratentorial white matter which are nonspecific and can be seen with demyelinating processes. She had a follow-up MRI brain with and without contrast performed this past June which showed stable bubbly hyperintense T2 signal lesion in medial aspect of right temporal lobe without enhancement. In addition, the stable nonspecific hyperintense FLAIR signals were again seen. Her initial MRI was performed for hearing loss, for which she follows with ENT. She reports that she has already established care with Neurosurgery from [place]. They told her that they are concerned about the region in her right temporal lobe but given the second MRI showed no significant change they plan on watching and monitoring with surveillance MRI imaging (next one in October). She denies any recent infections or history of autoimmune disease besides her hypothyroidism. No neurological disorders in her family. Her father had RA, no other autoimmune disorders. No other current symptoms besides issues with hearing loss R > L. She also follows with Opthalmology and  was told she may have some degeneration of her retina. From a vascular standpoint, she had a lipid panel in 4/2025 which showed elevated total cholesterol 226, elevated , and elevated triglycerides 131. She is a nonsmoker and drinks alcohol socially.     Sensory symptoms: numbness in both hands at night, has history of carpal tunnel syndrome diagnosed via EMG/NCS last May recommended to have surgery. Wears a brace at night  Weakness: no  Spasms: no  Vision problems: eyes blurry when reading too long. Episode 4 years ago when vision became blurry for a few minutes then resolved. No pain  Ambulatory dysfunction: no  Vertigo and dizziness: occasional lightheadedness standing up too quickly  Fatigue: no  Bladder symptoms: no  Bowel symptoms: no  Lhermitte sign: no  Uhthoff's phenomenon: no  MS hug-like symptom: no  Family history of neurological disorders: no  Has some insomnia (difficulty getting back to sleep)    Review of Systems   Genitourinary:  Negative for difficulty urinating.   Neurological:  Positive for light-headedness. Negative for dizziness, tremors, seizures, syncope, facial asymmetry, speech difficulty, weakness, numbness and headaches.   Psychiatric/Behavioral:  Negative for confusion.     I have personally reviewed the MA's review of systems and made changes as necessary.     Objective   LMP  (LMP Unknown)     Physical Exam    Eyes:      General: Lids are normal.      Extraocular Movements: Extraocular movements intact.      Pupils: Pupils are equal, round, and reactive to light.       Neurological:      Cranial Nerves: No dysarthria.      Deep Tendon Reflexes:      Reflex Scores:       Bicep reflexes are 2+ on the right side and 2+ on the left side.       Brachioradialis reflexes are 2+ on the right side and 2+ on the left side.       Patellar reflexes are 2+ on the right side and 2+ on the left side.       Achilles reflexes are 1+ on the right side and 1+ on the left side.      Neurological  Exam  Mental Status  Awake, alert and oriented to person, place and time. no dysarthria present. Language is fluent with no aphasia.    Cranial Nerves  CN II: Visual acuity is normal. Visual fields full to confrontation.  CN III, IV, VI: Extraocular movements intact bilaterally. Normal lids and orbits bilaterally. Pupils equal round and reactive to light bilaterally.  CN V: Facial sensation is normal.  CN VII: Full and symmetric facial movement.  CN VIII: Hearing is normal.  CN IX, X: Palate elevates symmetrically. Normal gag reflex.  CN XI: Shoulder shrug strength is normal.  CN XII: Tongue midline without atrophy or fasciculations.    Motor   No abnormal involuntary movements.                                               Right                     Left   Shoulder abduction               5                          5  Elbow flexion                         5                          5  Elbow extension                    5                          5  Hip flexion                              5                          5  Knee flexion                           5                          5  Knee extension                      5                          5  Plantarflexion                         5                          5  Dorsiflexion                            5                          5    Sensory  Light touch is normal in upper and lower extremities.     Reflexes                                            Right                      Left  Brachioradialis                    2+                         2+  Biceps                                 2+                         2+  Patellar                                2+                         2+  Achilles                                1+                         1+  Right Plantar: downgoing  Left Plantar: downgoing    Right pathological reflexes: Manuel's absent. Crossed adductor absent. Ankle clonus absent.  Left pathological reflexes: Manuel's absent. Crossed  adductor absent. Ankle clonus present.  Nonsustained clonus present on left (round 1 beat) .    Coordination  Right: Finger-to-nose normal. Heel-to-shin normal.Left: Finger-to-nose normal. Heel-to-shin normal.    Gait  Casual gait is normal including stance, stride, and arm swing.

## 2025-07-10 DIAGNOSIS — G37.9 DEMYELINATING CHANGES IN BRAIN (HCC): Primary | ICD-10-CM

## 2025-07-14 ENCOUNTER — TELEPHONE (OUTPATIENT)
Age: 55
End: 2025-07-14

## 2025-07-14 NOTE — TELEPHONE ENCOUNTER
Patient called; stated Neurosurgery ordered an MRI Head and requesting if Patria Soria MD could place order for this because she would like to complete this at the same time as both MRI cervical spine with and without contrast (Order [id number]) and MRI thoracic spine w wo contrast (Order [id number]).  Advised will forward request and someone will call back.      Please assist,    Thank you

## 2025-07-29 ENCOUNTER — ANNUAL EXAM (OUTPATIENT)
Dept: OBGYN CLINIC | Facility: CLINIC | Age: 55
End: 2025-07-29
Payer: COMMERCIAL

## 2025-07-29 VITALS
HEIGHT: 66 IN | SYSTOLIC BLOOD PRESSURE: 126 MMHG | BODY MASS INDEX: 21.89 KG/M2 | WEIGHT: 136.2 LBS | DIASTOLIC BLOOD PRESSURE: 80 MMHG

## 2025-07-29 DIAGNOSIS — Z78.0 POSTMENOPAUSAL: ICD-10-CM

## 2025-07-29 DIAGNOSIS — R92.30 BREAST DENSITY: ICD-10-CM

## 2025-07-29 DIAGNOSIS — H91.91 HEARING LOSS ASSOCIATED WITH SYNDROME OF RIGHT EAR: ICD-10-CM

## 2025-07-29 DIAGNOSIS — Z12.31 ENCOUNTER FOR SCREENING MAMMOGRAM FOR MALIGNANT NEOPLASM OF BREAST: Primary | ICD-10-CM

## 2025-07-29 DIAGNOSIS — D34 FOLLICULAR ADENOMA OF THYROID GLAND: ICD-10-CM

## 2025-07-29 DIAGNOSIS — Z01.419 ENCOUNTER FOR GYNECOLOGICAL EXAMINATION WITHOUT ABNORMAL FINDING: ICD-10-CM

## 2025-07-29 DIAGNOSIS — G37.9 DEMYELINATING CHANGES IN BRAIN (HCC): ICD-10-CM

## 2025-07-29 DIAGNOSIS — Z12.4 SCREENING FOR CERVICAL CANCER: ICD-10-CM

## 2025-07-29 DIAGNOSIS — Z12.11 SCREENING FOR COLON CANCER: ICD-10-CM

## 2025-07-29 DIAGNOSIS — E89.0 POSTOPERATIVE HYPOTHYROIDISM: ICD-10-CM

## 2025-07-29 PROCEDURE — S0612 ANNUAL GYNECOLOGICAL EXAMINA: HCPCS | Performed by: OBSTETRICS & GYNECOLOGY

## 2025-08-07 ENCOUNTER — TELEPHONE (OUTPATIENT)
Age: 55
End: 2025-08-07

## 2025-08-12 ENCOUNTER — TRANSCRIBE ORDERS (OUTPATIENT)
Age: 55
End: 2025-08-12

## 2025-08-17 DIAGNOSIS — E89.0 POSTOPERATIVE HYPOTHYROIDISM: ICD-10-CM

## 2025-08-19 RX ORDER — LEVOTHYROXINE SODIUM 125 UG/1
TABLET ORAL
Qty: 72 TABLET | Refills: 1 | Status: SHIPPED | OUTPATIENT
Start: 2025-08-19

## 2025-08-20 ENCOUNTER — RESULTS FOLLOW-UP (OUTPATIENT)
Dept: OBGYN CLINIC | Facility: CLINIC | Age: 55
End: 2025-08-20

## 2025-08-20 LAB — COLOGUARD RESULT REPORTABLE: NEGATIVE

## 2025-08-23 LAB
ANA HOMOGEN TITR SER: ABNORMAL {TITER}
ANA TITR SER IF: POSITIVE {TITER}
CENTROMERE B AB SER-ACNC: <0.2 AI (ref 0–0.9)
CHROMATIN AB SERPL-ACNC: 0.2 AI (ref 0–0.9)
DSDNA AB SER-ACNC: 5 IU/ML (ref 0–9)
ENA JO1 AB SER-ACNC: <0.2 AI (ref 0–0.9)
ENA RNP AB SER-ACNC: 0.2 AI (ref 0–0.9)
ENA SCL70 AB SER-ACNC: <0.2 AI (ref 0–0.9)
ENA SM AB SER-ACNC: <0.2 AI (ref 0–0.9)
ENA SS-A AB SER-ACNC: <0.2 AI (ref 0–0.9)
ENA SS-B AB SER-ACNC: <0.2 AI (ref 0–0.9)
EST. AVERAGE GLUCOSE BLD GHB EST-MCNC: 123 MG/DL
HBA1C MFR BLD: 5.9 % (ref 4.8–5.6)
LABORATORY COMMENT REPORT: ABNORMAL
SL AMB NOTE:: ABNORMAL
SL AMB SEE BELOW:: ABNORMAL
VIT B12 SERPL-MCNC: 813 PG/ML (ref 232–1245)